# Patient Record
Sex: FEMALE | Race: BLACK OR AFRICAN AMERICAN | NOT HISPANIC OR LATINO | Employment: FULL TIME | URBAN - METROPOLITAN AREA
[De-identification: names, ages, dates, MRNs, and addresses within clinical notes are randomized per-mention and may not be internally consistent; named-entity substitution may affect disease eponyms.]

---

## 2017-02-07 ENCOUNTER — OFFICE VISIT (OUTPATIENT)
Dept: FAMILY MEDICINE | Facility: CLINIC | Age: 37
End: 2017-02-07
Payer: COMMERCIAL

## 2017-02-07 VITALS
RESPIRATION RATE: 16 BRPM | HEART RATE: 70 BPM | BODY MASS INDEX: 45.99 KG/M2 | SYSTOLIC BLOOD PRESSURE: 128 MMHG | OXYGEN SATURATION: 98 % | HEIGHT: 67 IN | TEMPERATURE: 99 F | WEIGHT: 293 LBS | DIASTOLIC BLOOD PRESSURE: 84 MMHG

## 2017-02-07 DIAGNOSIS — H10.9 BACTERIAL CONJUNCTIVITIS OF LEFT EYE: Primary | ICD-10-CM

## 2017-02-07 PROCEDURE — 99999 PR PBB SHADOW E&M-EST. PATIENT-LVL III: CPT | Mod: PBBFAC,,,

## 2017-02-07 PROCEDURE — 99214 OFFICE O/P EST MOD 30 MIN: CPT | Mod: S$GLB,,, | Performed by: NURSE PRACTITIONER

## 2017-02-07 RX ORDER — IBUPROFEN 800 MG/1
800 TABLET ORAL 3 TIMES DAILY
COMMUNITY
End: 2017-03-10

## 2017-02-07 RX ORDER — HYDROCODONE BITARTRATE AND ACETAMINOPHEN 5; 325 MG/1; MG/1
1 TABLET ORAL EVERY 6 HOURS PRN
COMMUNITY
End: 2017-03-10

## 2017-02-07 RX ORDER — CIPROFLOXACIN HYDROCHLORIDE 3 MG/ML
1 SOLUTION/ DROPS OPHTHALMIC 3 TIMES DAILY
Qty: 30 DROP | Refills: 0 | Status: SHIPPED | OUTPATIENT
Start: 2017-02-07 | End: 2017-02-17

## 2017-02-07 NOTE — PATIENT INSTRUCTIONS
Conjunctivitis, Bacterial    You have an infection in the membranes covering the white part of the eye. This part of the eye is called the conjunctiva. The infection is called conjunctivitis. The most common symptoms of conjunctivitis include a thick, pus-like discharge from the eye, swollen eyelids, redness, eyelids sticking together upon awakening, and a gritty or scratchy feeling in the eye. Your infection was caused by bacteria. It may be treated with medicine. With treatment, the infection takes about 7 to 10 days to resolve.  Home care  · Use prescribed antibiotic eye drops or ointment as directed to treat the infection.  · Apply a warm compress (towel soaked in warm water) to the affected eye 3 to 4 times a day. Do this just before applying medicine to the eye.  · Use a warm, wet cloth to wipe away crusting of the eyelids in the morning. This is caused by mucus drainage during the night. You may also use saline irrigating solution or artificial tears to rinse away mucus in the eye. Do not put a patch over the eye.  · Wash your hands before and after touching the infected eye. This is to prevent spreading the infection to the other eye, and to other people. Do not share your towels or washcloths with others.  · You may use acetaminophen or ibuprofen to control pain, unless another medicine was prescribed. (Note: If you have chronic liver or kidney disease or have ever had a stomach ulcer or gastrointestinal bleeding, talk with your doctor before using these medicines.)  · Do not wear contact lenses until your eyes have healed and all symptoms are gone.  Follow-up care  Follow up with your healthcare provider, or as advised.  When to seek medical advice  Call your healthcare provider right away if any of these occur:  · Worsening vision  · Increasing pain in the eye  · Increasing swelling or redness of the eyelid  · Redness spreading around the eye  Date Last Reviewed: 6/14/2015  © 5706-4637 [employer]. [address] [place] [address], [place] [address]. All rights reserved. This information is not intended as a substitute for professional medical care. Always follow your healthcare professional's instructions.

## 2017-02-07 NOTE — MR AVS SNAPSHOT
[facility]  [address]  [place] [address]  Phone: [phone]  Fax: [phone]                  Arleen Powell   2017 11:15 AM   Office Visit    Description:  Female : 1980   Provider:  LAPALCO, W[facility] IN   Department:  [facility]           Reason for Visit     Eye Problem           Diagnoses this Visit        Comments    Bacterial conjunctivitis of left eye    -  Primary            To Do List           Future Appointments        Provider Department Dept Phone    2017 11:15 AM LAPALCO, WALK IN [facility] [phone]      Goals (5 Years of Data)     None      Follow-Up and Disposition     Return if symptoms worsen or fail to improve.       These Medications        Disp Refills Start End    ciprofloxacin HCl (CILOXAN) 0.3 % ophthalmic solution 30 drop 0 2017    Place 1 drop into the left eye 3 (three) times daily. - Left Eye    Pharmacy: [facility] [address] [place], [place] [address] [address] Ph #: [phone]         [facility] On Call     [facility] On Call Nurse Care Line -  Assistance  Registered nurses in the [facility] On Call Center provide clinical advisement, health education, appointment booking, and other advisory services.  Call for this free service at [phone].             Medications           Message regarding Medications     Verify the changes and/or additions to your medication regime listed below are the same as discussed with your clinician today.  If any of these changes or additions are incorrect, please notify your healthcare provider.        START taking these NEW medications        Refills    ciprofloxacin HCl (CILOXAN) 0.3 % ophthalmic solution 0    Sig: Place 1 drop into the left eye 3 (three) times daily.    Class: Normal    Route: Left Eye           Verify that the below list of medications is an accurate representation of the medications you are currently taking.  If  "none reported, the list may be blank. If incorrect, please contact your healthcare provider. Carry this list with you in case of emergency.           Current Medications     hydrocodone-acetaminophen 5-325mg (NORCO) 5-325 mg per tablet Take 1 tablet by mouth every 6 (six) hours as needed for Pain.    ibuprofen (ADVIL,MOTRIN) 800 MG tablet Take 800 mg by mouth 3 (three) times daily.    ciprofloxacin HCl (CILOXAN) 0.3 % ophthalmic solution Place 1 drop into the left eye 3 (three) times daily.    valacyclovir (VALTREX) 1000 MG tablet Take 0.5 tablets (500 mg total) by mouth 2 (two) times daily.           Clinical Reference Information           Your Vitals Were     BP Pulse Temp Resp Height Weight    128/84 (BP Location: Left arm, Patient Position: Sitting, BP Method: Manual) 70 98.7 °F (37.1 °C) (Oral) 16 5' 7" (1.702 m) 143 kg (315 lb 4.1 oz)    Last Period SpO2 BMI          02/01/2017 98% 49.38 kg/m2        Blood Pressure          Most Recent Value    BP  128/84      Allergies as of 2/7/2017     No Known Allergies      Immunizations Administered on Date of Encounter - 2/7/2017     None      [facility] Sign-Up     Activating your [facility] account is as easy as 1-2-3!     1) Visit [email or web address], select Sign Up Now, enter this activation code and your date of birth, then select Next.  [email or web address]  Expires: 3/24/2017 11:04 AM      2) Create a username and password to use when you visit [facility] in the future and select a security question in case you lose your password and select Next.    3) Enter your e-mail address and click Sign Up!    Additional Information  If you have questions, please e-mail [email or web address] or call [phone] to talk to our [facility] staff. Remember, [facility] is NOT to be used for urgent needs. For medical emergencies, dial 911.         Instructions      Conjunctivitis, Bacterial    You have an infection in the membranes covering the white part of the eye. This part of the eye is " called the conjunctiva. The infection is called conjunctivitis. The most common symptoms of conjunctivitis include a thick, pus-like discharge from the eye, swollen eyelids, redness, eyelids sticking together upon awakening, and a gritty or scratchy feeling in the eye. Your infection was caused by bacteria. It may be treated with medicine. With treatment, the infection takes about 7 to 10 days to resolve.  Home care  · Use prescribed antibiotic eye drops or ointment as directed to treat the infection.  · Apply a warm compress (towel soaked in warm water) to the affected eye 3 to 4 times a day. Do this just before applying medicine to the eye.  · Use a warm, wet cloth to wipe away crusting of the eyelids in the morning. This is caused by mucus drainage during the night. You may also use saline irrigating solution or artificial tears to rinse away mucus in the eye. Do not put a patch over the eye.  · Wash your hands before and after touching the infected eye. This is to prevent spreading the infection to the other eye, and to other people. Do not share your towels or washcloths with others.  · You may use acetaminophen or ibuprofen to control pain, unless another medicine was prescribed. (Note: If you have chronic liver or kidney disease or have ever had a stomach ulcer or gastrointestinal bleeding, talk with your doctor before using these medicines.)  · Do not wear contact lenses until your eyes have healed and all symptoms are gone.  Follow-up care  Follow up with your healthcare provider, or as advised.  When to seek medical advice  Call your healthcare provider right away if any of these occur:  · Worsening vision  · Increasing pain in the eye  · Increasing swelling or redness of the eyelid  · Redness spreading around the eye  Date Last Reviewed: 6/14/2015  © 8741-1252 [employer]. [address] [place] [address], [place] [address]. All rights reserved. This information is not intended as a substitute for  professional medical care. Always follow your healthcare professional's instructions.             Language Assistance Services     ATTENTION: Language assistance services are available, free of charge. Please call [phone].      ATENCIÓN: Si habla avinash, tiene a malik disposición servicios gratuitos de asistencia lingüística. Llame al [phone].     CHÚ Ý: N?u b?n nói Ti?ng Vi?t, có các d?ch v? h? tr? ngôn ng? mi?n phí dành cho b?n. G?i s? [phone].         [facility] complies with applicable Federal civil rights laws and does not discriminate on the basis of race, color, national origin, age, disability, or sex.

## 2017-02-07 NOTE — PROGRESS NOTES
"History of Present Illness   Arleen Powell is a 37 y.o. woman with medical history as listed below who presents today with five day history of left eye redness, itching, and burning pain. She first noticed symptoms after removing contact lenses that she had not worn in quite some time. This morning she woke with eye crusted shut and has had continued drainage throughout the day. She denies associated fevers or chills. She has no additional complaints and is otherwise healthy on today's visit.       Past Medical History   Diagnosis Date    Abnormal Pap smear     Abnormal Pap smear of vagina     Hypertension     Pre-eclampsia        Past Surgical History   Procedure Laterality Date    Pelvic laparoscopy Right 2013     SALPINJECTOMY(ECTOPIC)       Social History     Social History    Marital status: Single     Spouse name: N/A    Number of children: N/A    Years of education: N/A     Social History Main Topics    Smoking status: Never Smoker    Smokeless tobacco: Never Used    Alcohol use No    Drug use: No    Sexual activity: Yes     Partners: Male     Birth control/ protection: None     Other Topics Concern    None     Social History Narrative       History reviewed. No pertinent family history.    Review of Systems  Review of Systems   Constitutional: Negative for chills and fever.   Eyes: Positive for photophobia, pain (burning), discharge and redness. Negative for blurred vision.     A complete review of systems was otherwise negative.    Physical Exam  Visit Vitals    /84 (BP Location: Left arm, Patient Position: Sitting, BP Method: Manual)    Pulse 70    Temp 98.7 °F (37.1 °C) (Oral)    Resp 16    Ht 5' 7" (1.702 m)    Wt (!) 143 kg (315 lb 4.1 oz)    LMP 02/01/2017    SpO2 98%    BMI 49.38 kg/m2     General appearance: alert, appears stated age, cooperative and no distress  Eyes: positive findings: conjunctiva: left bacterial conjunctivitis  Neurologic: Grossly " normal    Assessment/Plan  Bacterial conjunctivitis of left eye  Treatment as listed below.  Discussed importance of good hand hygiene.  Avoid contacts until clear.  May use warm compress to remove morning crusting.  May try Zyrtec or Benadryl for itching.  Contact me or RTC if symptoms worsen or fail to improve as expected.  -     ciprofloxacin HCl (CILOXAN) 0.3 % ophthalmic solution; Place 1 drop into the left eye 3 (three) times daily.  Dispense: 30 drop; Refill: 0      Return if symptoms worsen or fail to improve.

## 2017-03-10 ENCOUNTER — OFFICE VISIT (OUTPATIENT)
Dept: FAMILY MEDICINE | Facility: CLINIC | Age: 37
End: 2017-03-10
Payer: COMMERCIAL

## 2017-03-10 VITALS
OXYGEN SATURATION: 97 % | TEMPERATURE: 99 F | BODY MASS INDEX: 44.41 KG/M2 | HEIGHT: 68 IN | WEIGHT: 293 LBS | HEART RATE: 94 BPM

## 2017-03-10 DIAGNOSIS — R05.9 COUGH: ICD-10-CM

## 2017-03-10 DIAGNOSIS — J11.1 INFLUENZA-LIKE ILLNESS: ICD-10-CM

## 2017-03-10 DIAGNOSIS — J01.90 ACUTE RHINOSINUSITIS: Primary | ICD-10-CM

## 2017-03-10 DIAGNOSIS — R52 BODY ACHES: ICD-10-CM

## 2017-03-10 LAB
CTP QC/QA: YES
FLUAV AG NPH QL: NEGATIVE
FLUBV AG NPH QL: NEGATIVE

## 2017-03-10 PROCEDURE — 99999 PR PBB SHADOW E&M-EST. PATIENT-LVL III: CPT | Mod: PBBFAC,,, | Performed by: NURSE PRACTITIONER

## 2017-03-10 PROCEDURE — 87804 INFLUENZA ASSAY W/OPTIC: CPT | Mod: QW,S$GLB,, | Performed by: NURSE PRACTITIONER

## 2017-03-10 PROCEDURE — 99214 OFFICE O/P EST MOD 30 MIN: CPT | Mod: S$GLB,,, | Performed by: NURSE PRACTITIONER

## 2017-03-10 RX ORDER — LEVOCETIRIZINE DIHYDROCHLORIDE 5 MG/1
5 TABLET, FILM COATED ORAL NIGHTLY
Qty: 30 TABLET | Refills: 0 | Status: SHIPPED | OUTPATIENT
Start: 2017-03-10 | End: 2017-08-23

## 2017-03-10 RX ORDER — FLUTICASONE PROPIONATE 50 MCG
1 SPRAY, SUSPENSION (ML) NASAL DAILY
Qty: 1 BOTTLE | Refills: 0 | Status: SHIPPED | OUTPATIENT
Start: 2017-03-10 | End: 2017-08-23

## 2017-03-10 RX ORDER — PROMETHAZINE HYDROCHLORIDE AND DEXTROMETHORPHAN HYDROBROMIDE 6.25; 15 MG/5ML; MG/5ML
5 SYRUP ORAL NIGHTLY
Qty: 180 ML | Refills: 0 | Status: SHIPPED | OUTPATIENT
Start: 2017-03-10 | End: 2017-03-20

## 2017-03-10 NOTE — PATIENT INSTRUCTIONS

## 2017-03-10 NOTE — PROGRESS NOTES
"History of Present Illness   Arleen Powell is a 37 y.o. woman with medical history as listed below who presents today with two day history of nasal congestion, PND, and productive cough. She also has some sinus pressure and headaches. She has had chills and body aches but denies known fevers. She denies ear pain or fullness, itchy watery eyes, or other complaints. She did receive a flu shot this year. She has had sick contacts. She has not tried OTC medications. She has no additional complaints and is otherwise healthy on today's visit.       Past Medical History:   Diagnosis Date    Abnormal Pap smear     Abnormal Pap smear of vagina     Hypertension     Pre-eclampsia        Past Surgical History:   Procedure Laterality Date    PELVIC LAPAROSCOPY Right 2013    SALPINJECTOMY(ECTOPIC)       Social History     Social History    Marital status: Single     Spouse name: N/A    Number of children: N/A    Years of education: N/A     Social History Main Topics    Smoking status: Never Smoker    Smokeless tobacco: Never Used    Alcohol use No    Drug use: No    Sexual activity: Yes     Partners: Male     Birth control/ protection: None     Other Topics Concern    None     Social History Narrative       History reviewed. No pertinent family history.    Review of Systems  Review of Systems   Constitutional: Positive for chills and malaise/fatigue. Negative for fever.   HENT: Positive for congestion and sore throat. Negative for ear discharge and ear pain.    Eyes: Negative for discharge and redness.   Respiratory: Positive for cough. Negative for sputum production, shortness of breath and wheezing.    Cardiovascular: Negative for chest pain.   Gastrointestinal: Negative for nausea and vomiting.   Neurological: Positive for headaches.     A complete review of systems was otherwise negative.    Physical Exam  Pulse 94  Temp 98.7 °F (37.1 °C) (Oral)   Ht 5' 8" (1.727 m)  Wt (!) 144.9 kg (319 lb 7.1 oz) "  LMP 03/02/2017  SpO2 97%  BMI 48.57 kg/m2  General appearance: alert, appears stated age, cooperative, no distress and ill appearing  Eyes: negative findings: lids and lashes normal and conjunctivae and sclerae normal  Ears: normal TM's and external ear canals both ears and bilateral middle ear effusion  Nose: clear discharge, moderate congestion, turbinates red, swollen, sinus tenderness bilateral  Throat: lips, mucosa, and tongue normal; teeth and gums normal and moderate oropharyngeal erythema with PND  Lungs: clear to auscultation bilaterally  Heart: regular rate and rhythm, S1, S2 normal, no murmur, click, rub or gallop  Lymph nodes: Cervical, supraclavicular, and axillary nodes normal.  Neurologic: Grossly normal    Assessment/Plan  Acute rhinosinusitis  Rapid flu negative. Discussed with patient this is viral, no antibiotics necessary. Xyzal and Flonase with nighttime cough syrup for symptomatic treatment. Ibuprofen or Tylenol as needed for fever and body aches. Plenty of rest and plenty of fluids. She will contact me if symptoms worsen or fail to improve. She has verbalized understanding and is in agreement with plan of care.  -     levocetirizine (XYZAL) 5 MG tablet; Take 1 tablet (5 mg total) by mouth every evening.  Dispense: 30 tablet; Refill: 0  -     fluticasone (FLONASE) 50 mcg/actuation nasal spray; 1 spray by Each Nare route once daily.  Dispense: 1 Bottle; Refill: 0  -     promethazine-dextromethorphan (PROMETHAZINE-DM) 6.25-15 mg/5 mL Syrp; Take 5 mLs by mouth every evening.  Dispense: 180 mL; Refill: 0    Influenza-like illness  As above.  -     levocetirizine (XYZAL) 5 MG tablet; Take 1 tablet (5 mg total) by mouth every evening.  Dispense: 30 tablet; Refill: 0  -     fluticasone (FLONASE) 50 mcg/actuation nasal spray; 1 spray by Each Nare route once daily.  Dispense: 1 Bottle; Refill: 0  -     promethazine-dextromethorphan (PROMETHAZINE-DM) 6.25-15 mg/5 mL Syrp; Take 5 mLs by mouth every  evening.  Dispense: 180 mL; Refill: 0    Cough  As above.  -     POCT Influenza A/B  -     levocetirizine (XYZAL) 5 MG tablet; Take 1 tablet (5 mg total) by mouth every evening.  Dispense: 30 tablet; Refill: 0  -     fluticasone (FLONASE) 50 mcg/actuation nasal spray; 1 spray by Each Nare route once daily.  Dispense: 1 Bottle; Refill: 0  -     promethazine-dextromethorphan (PROMETHAZINE-DM) 6.25-15 mg/5 mL Syrp; Take 5 mLs by mouth every evening.  Dispense: 180 mL; Refill: 0    Body aches  As above.  -     POCT Influenza A/B  -     levocetirizine (XYZAL) 5 MG tablet; Take 1 tablet (5 mg total) by mouth every evening.  Dispense: 30 tablet; Refill: 0  -     fluticasone (FLONASE) 50 mcg/actuation nasal spray; 1 spray by Each Nare route once daily.  Dispense: 1 Bottle; Refill: 0  -     promethazine-dextromethorphan (PROMETHAZINE-DM) 6.25-15 mg/5 mL Syrp; Take 5 mLs by mouth every evening.  Dispense: 180 mL; Refill: 0      Return if symptoms worsen or fail to improve.

## 2017-03-10 NOTE — MR AVS SNAPSHOT
[facility]  [address]  [place] [address]  Phone: [phone]  Fax: [phone]                  Arleen Powell   3/10/2017 10:45 AM   Office Visit    Description:  Female : 1980   Provider:  Samantha Taylor NP   Department:  [facility]           Reason for Visit     URI           Diagnoses this Visit        Comments    Acute rhinosinusitis    -  Primary     Influenza-like illness         Cough         Body aches                To Do List           Goals (5 Years of Data)     None      Follow-Up and Disposition     Return if symptoms worsen or fail to improve.       These Medications        Disp Refills Start End    levocetirizine (XYZAL) 5 MG tablet 30 tablet 0 3/10/2017 3/10/2018    Take 1 tablet (5 mg total) by mouth every evening. - Oral    Pharmacy: [address] Ph #: [phone]       fluticasone (FLONASE) 50 mcg/actuation nasal spray 1 Bottle 0 3/10/2017     1 spray by Each Nare route once daily. - Each Nare    Pharmacy: [address] Ph #: [phone]       promethazine-dextromethorphan (PROMETHAZINE-DM) 6.25-15 mg/5 mL Syrp 180 mL 0 3/10/2017 3/20/2017    Take 5 mLs by mouth every evening. - Oral    Pharmacy: [address] Ph #: [phone]         [facility] On [facility]     [facility] -  Assistance  Registered nurses in the [facility] provide clinical advisement, health education, appointment booking, and other advisory services.  Call for this free service at [phone].             Medications           Message regarding Medications     Verify the changes and/or additions to your medication regime listed below are the same as discussed with your clinician today.  If any of these changes or additions are incorrect, please notify  "your healthcare provider.        START taking these NEW medications        Refills    levocetirizine (XYZAL) 5 MG tablet 0    Sig: Take 1 tablet (5 mg total) by mouth every evening.    Class: Normal    Route: Oral    fluticasone (FLONASE) 50 mcg/actuation nasal spray 0    Si spray by Each Nare route once daily.    Class: Normal    Route: Each Nare    promethazine-dextromethorphan (PROMETHAZINE-DM) 6.25-15 mg/5 mL Syrp 0    Sig: Take 5 mLs by mouth every evening.    Class: Normal    Route: Oral      STOP taking these medications     hydrocodone-acetaminophen 5-325mg (NORCO) 5-325 mg per tablet Take 1 tablet by mouth every 6 (six) hours as needed for Pain.    ibuprofen (ADVIL,MOTRIN) 800 MG tablet Take 800 mg by mouth 3 (three) times daily.    valacyclovir (VALTREX) 1000 MG tablet Take 0.5 tablets (500 mg total) by mouth 2 (two) times daily.           Verify that the below list of medications is an accurate representation of the medications you are currently taking.  If none reported, the list may be blank. If incorrect, please contact your healthcare provider. Carry this list with you in case of emergency.           Current Medications     fluticasone (FLONASE) 50 mcg/actuation nasal spray 1 spray by Each Nare route once daily.    levocetirizine (XYZAL) 5 MG tablet Take 1 tablet (5 mg total) by mouth every evening.    promethazine-dextromethorphan (PROMETHAZINE-DM) 6.25-15 mg/5 mL Syrp Take 5 mLs by mouth every evening.           Clinical Reference Information           Your Vitals Were     Pulse Temp Height Weight Last Period SpO2    94 98.7 °F (37.1 °C) (Oral) 5' 8" (1.727 m) 144.9 kg (319 lb 7.1 oz) 2017 97%    BMI                48.57 kg/m2          Allergies as of 3/10/2017     No Known Allergies      Immunizations Administered on Date of Encounter - 3/10/2017     None      Orders Placed During Today's Visit      Normal Orders This Visit    POCT Influenza A/B       [facility] Sign-Up     Activating your " [facility] account is as easy as 1-2-3!     1) Visit [email or web address], select Sign Up Now, enter this activation code and your date of birth, then select Next.  [email or web address]  Expires: 3/24/2017 11:04 AM      2) Create a username and password to use when you visit [facility] in the future and select a security question in case you lose your password and select Next.    3) Enter your e-mail address and click Sign Up!    Additional Information  If you have questions, please e-mail [email or web address] or call [phone] to talk to our [facility] staff. Remember, [facility] is NOT to be used for urgent needs. For medical emergencies, dial 911.         Instructions      Sinusitis (No Antibiotics)    The sinuses are air-filled spaces within the bones of the face. They connect to the inside of the nose. Sinusitis is an inflammation of the tissue lining the sinus cavity. Sinus inflammation can occur during a cold. It can also be due to allergies to pollens and other particles in the air. It can cause symptoms such as sinus congestion, headache, sore throat, facial swelling and fullness. It may also cause a low-grade fever. No infection is present, and no antibiotic treatment is needed.  Home care  · Drink plenty of water, hot tea, and other liquids. This may help thin mucus. It also may promote sinus drainage.  · Heat may help soothe painful areas of the face. Use a towel soaked in hot water. Or,  the shower and direct the hot spray onto your face. Using a vaporizer along with a menthol rub at night may also help.   · An expectorant containing guaifenesin may help thin the mucus and promote drainage from the sinuses.  · Over-the-counter decongestants may be used unless a similar medicine was prescribed. Nasal sprays work the fastest. Use one that contains phenylephrine or oxymetazoline. First blow the nose gently. Then use the spray. Do not use these medicines more often than directed on the label or symptoms may  get worse. You may also use tablets containing pseudoephedrine. Avoid products that combine ingredients, because side effects may be increased. Read labels. You can also ask the pharmacist for help. (NOTE: Persons with high blood pressure should not use decongestants. They can raise blood pressure.)  · Over-the-counter antihistamines may help if allergies contributed to your sinusitis.    · Use acetaminophen or ibuprofen to control pain, unless another pain medicine was prescribed. (If you have chronic liver or kidney disease or ever had a stomach ulcer, talk with your doctor before using these medicines. Aspirin should never be used in anyone under 18 years of age who is ill with a fever. It may cause severe liver damage.)  · Use nasal rinses or irrigation as instructed by your health care provider.  · Don't smoke. This can worsen symptoms.  Follow-up care  Follow up with your healthcare provider or our staff if you are not improving within the next week.  When to seek medical advice  Call your healthcare provider if any of these occur:  · Green or yellow discharge from the nose or into the throat  · Facial pain or headache becoming more severe  · Stiff neck  · Unusual drowsiness or confusion  · Swelling of the forehead or eyelids  · Vision problems, including blurred or double vision  · Fever of 100.4ºF (38ºC) or higher, or as directed by your healthcare provider  · Seizure  · Breathing problems  · Symptoms not resolving within 10 days  Date Last Reviewed: 4/13/2015  © 6528-1266 [employer]. [address] [place] [address]. All rights reserved. This information is not intended as a substitute for professional medical care. Always follow your healthcare professional's instructions.             Language Assistance Services     ATTENTION: Language assistance services are available, free of charge. Please call [phone].      ATENCIÓN: Si julian da silva, bita a malik disposición servicios  loreos de asistencia lingüística. [place] mota [phone].     JOSEPH Ý: N?u b?n nói Ti?ng Vi?t, có các d?ch v? h? tr? ngôn ng? mi?n phí dành cho b?n. G?i s? [phone].         [facility] complies with applicable Federal civil rights laws and does not discriminate on the basis of race, color, national origin, age, disability, or sex.

## 2017-08-23 ENCOUNTER — OFFICE VISIT (OUTPATIENT)
Dept: FAMILY MEDICINE | Facility: CLINIC | Age: 37
End: 2017-08-23
Payer: COMMERCIAL

## 2017-08-23 VITALS
RESPIRATION RATE: 17 BRPM | DIASTOLIC BLOOD PRESSURE: 80 MMHG | WEIGHT: 293 LBS | SYSTOLIC BLOOD PRESSURE: 126 MMHG | TEMPERATURE: 98 F | BODY MASS INDEX: 44.41 KG/M2 | OXYGEN SATURATION: 98 % | HEART RATE: 72 BPM | HEIGHT: 68 IN

## 2017-08-23 DIAGNOSIS — G89.29 CHRONIC PAIN OF RIGHT KNEE: Primary | ICD-10-CM

## 2017-08-23 DIAGNOSIS — M25.561 CHRONIC PAIN OF RIGHT KNEE: Primary | ICD-10-CM

## 2017-08-23 DIAGNOSIS — E66.01 MORBID OBESITY, UNSPECIFIED OBESITY TYPE: ICD-10-CM

## 2017-08-23 PROCEDURE — 3079F DIAST BP 80-89 MM HG: CPT | Mod: S$GLB,,, | Performed by: INTERNAL MEDICINE

## 2017-08-23 PROCEDURE — 3074F SYST BP LT 130 MM HG: CPT | Mod: S$GLB,,, | Performed by: INTERNAL MEDICINE

## 2017-08-23 PROCEDURE — 3008F BODY MASS INDEX DOCD: CPT | Mod: S$GLB,,, | Performed by: INTERNAL MEDICINE

## 2017-08-23 PROCEDURE — 99214 OFFICE O/P EST MOD 30 MIN: CPT | Mod: S$GLB,,, | Performed by: INTERNAL MEDICINE

## 2017-08-23 PROCEDURE — 99999 PR PBB SHADOW E&M-EST. PATIENT-LVL III: CPT | Mod: PBBFAC,,, | Performed by: INTERNAL MEDICINE

## 2017-08-23 RX ORDER — NAPROXEN SODIUM 220 MG
220 TABLET ORAL
COMMUNITY
End: 2018-01-17

## 2017-08-23 NOTE — PROGRESS NOTES
"Subjective:       Patient ID: Arleen Powell is a 37 y.o. female.    Chief Complaint: Knee Pain (right)    Knee pain    HPI: 38 y/o w/ morbid obesity comes to discuss acute on chronic right lateral knee pain. Pain has been present intermittently for several years worse over last month. Not taking any medications for pain pain lateral aspect of knee with radiation to lower leg (not to foot) no paratheisa or motor weakness. She is working on losing weight by preparing meals, has changed work schedule and tryign to increase physical activity (walking). No history of knee surgery or injection no laxity. No trauma. NSAID's relieves pain (takes two to three times per week)      Review of Systems   Constitutional: Negative for activity change, appetite change, fatigue, fever and unexpected weight change.   HENT: Negative for ear pain, rhinorrhea and sore throat.    Eyes: Negative for discharge and visual disturbance.   Respiratory: Negative for chest tightness, shortness of breath and wheezing.    Cardiovascular: Negative for chest pain, palpitations and leg swelling.   Gastrointestinal: Negative for abdominal pain, constipation and diarrhea.   Endocrine: Negative for cold intolerance and heat intolerance.   Genitourinary: Negative for dysuria and hematuria.   Musculoskeletal: Positive for arthralgias. Negative for joint swelling and neck stiffness.   Skin: Negative for rash.   Neurological: Negative for dizziness, syncope, weakness and headaches.   Psychiatric/Behavioral: Negative for suicidal ideas.       Objective:     Vitals:    08/23/17 1121   BP: 126/80   BP Location: Left arm   Patient Position: Sitting   BP Method: Large (Manual)   Pulse: 72   Resp: 17   Temp: 98 °F (36.7 °C)   TempSrc: Oral   SpO2: 98%   Weight: (!) 145.6 kg (321 lb)   Height: 5' 8" (1.727 m)          Physical Exam   Constitutional: She is oriented to person, place, and time. She appears well-developed and well-nourished.   obese   HENT: "   Head: Normocephalic and atraumatic.   Eyes: Conjunctivae are normal. Pupils are equal, round, and reactive to light.   Neck: Normal range of motion.   Cardiovascular: Normal rate and regular rhythm.  Exam reveals no gallop and no friction rub.    No murmur heard.  Pulmonary/Chest: Effort normal and breath sounds normal. She has no wheezes. She has no rales.   Abdominal: Soft. Bowel sounds are normal. There is no tenderness. There is no rebound and no guarding.   Musculoskeletal: Normal range of motion. She exhibits no edema or tenderness.   No appreciable joint effusion by exam limited by body habitus. No appreciabel laxity with anterior/posterior drawer or varus valgus strain   Neurological: She is alert and oriented to person, place, and time. No cranial nerve deficit.   5/5 knee flexion and extension bilaterally normal gait observed   Skin: Skin is warm and dry.   Psychiatric: She has a normal mood and affect.       Assessment:       1. Chronic pain of right knee    2. Morbid obesity, unspecified obesity type        Plan:    no evidence of ligamentous injury consider ITB syndrome versus PFS, would benefit from weight loss not limiting in function currently okay for PRN NSAID not to exceed five consecutive days    2. Check a1c, tsh and lfts for secondary causes or complications encourage calorie count if no improvement in three months we discussed meeting with bariatric medicine.

## 2017-12-06 ENCOUNTER — OFFICE VISIT (OUTPATIENT)
Dept: FAMILY MEDICINE | Facility: CLINIC | Age: 37
End: 2017-12-06
Payer: COMMERCIAL

## 2017-12-06 ENCOUNTER — LAB VISIT (OUTPATIENT)
Dept: LAB | Facility: HOSPITAL | Age: 37
End: 2017-12-06
Attending: INTERNAL MEDICINE
Payer: COMMERCIAL

## 2017-12-06 VITALS
HEIGHT: 68 IN | BODY MASS INDEX: 44.41 KG/M2 | OXYGEN SATURATION: 99 % | WEIGHT: 293 LBS | RESPIRATION RATE: 18 BRPM | DIASTOLIC BLOOD PRESSURE: 82 MMHG | TEMPERATURE: 98 F | SYSTOLIC BLOOD PRESSURE: 126 MMHG | HEART RATE: 78 BPM

## 2017-12-06 DIAGNOSIS — G89.29 CHRONIC PAIN OF RIGHT KNEE: ICD-10-CM

## 2017-12-06 DIAGNOSIS — M25.561 CHRONIC PAIN OF RIGHT KNEE: ICD-10-CM

## 2017-12-06 DIAGNOSIS — M76.31 ILIOTIBIAL BAND SYNDROME OF RIGHT SIDE: Primary | ICD-10-CM

## 2017-12-06 DIAGNOSIS — E66.01 MORBID OBESITY, UNSPECIFIED OBESITY TYPE: ICD-10-CM

## 2017-12-06 DIAGNOSIS — E66.01 MORBID OBESITY: ICD-10-CM

## 2017-12-06 LAB
ALBUMIN SERPL BCP-MCNC: 3.4 G/DL
ALP SERPL-CCNC: 82 U/L
ALT SERPL W/O P-5'-P-CCNC: 18 U/L
ANION GAP SERPL CALC-SCNC: 5 MMOL/L
AST SERPL-CCNC: 18 U/L
BASOPHILS # BLD AUTO: 0.02 K/UL
BASOPHILS NFR BLD: 0.3 %
BILIRUB SERPL-MCNC: 0.4 MG/DL
BUN SERPL-MCNC: 9 MG/DL
CALCIUM SERPL-MCNC: 9.2 MG/DL
CHLORIDE SERPL-SCNC: 105 MMOL/L
CO2 SERPL-SCNC: 29 MMOL/L
CREAT SERPL-MCNC: 0.8 MG/DL
DIFFERENTIAL METHOD: ABNORMAL
EOSINOPHIL # BLD AUTO: 0.3 K/UL
EOSINOPHIL NFR BLD: 3.8 %
ERYTHROCYTE [DISTWIDTH] IN BLOOD BY AUTOMATED COUNT: 16.7 %
EST. GFR  (AFRICAN AMERICAN): >60 ML/MIN/1.73 M^2
EST. GFR  (NON AFRICAN AMERICAN): >60 ML/MIN/1.73 M^2
GLUCOSE SERPL-MCNC: 86 MG/DL
HCT VFR BLD AUTO: 36.4 %
HGB BLD-MCNC: 11.1 G/DL
LYMPHOCYTES # BLD AUTO: 2 K/UL
LYMPHOCYTES NFR BLD: 28.7 %
MCH RBC QN AUTO: 23.4 PG
MCHC RBC AUTO-ENTMCNC: 30.5 G/DL
MCV RBC AUTO: 77 FL
MONOCYTES # BLD AUTO: 0.9 K/UL
MONOCYTES NFR BLD: 13.2 %
NEUTROPHILS # BLD AUTO: 3.8 K/UL
NEUTROPHILS NFR BLD: 53.9 %
PLATELET # BLD AUTO: 271 K/UL
PMV BLD AUTO: 11.6 FL
POTASSIUM SERPL-SCNC: 4.4 MMOL/L
PROT SERPL-MCNC: 7.5 G/DL
RBC # BLD AUTO: 4.74 M/UL
SODIUM SERPL-SCNC: 139 MMOL/L
TSH SERPL DL<=0.005 MIU/L-ACNC: 2.21 UIU/ML
WBC # BLD AUTO: 7.05 K/UL

## 2017-12-06 PROCEDURE — 84443 ASSAY THYROID STIM HORMONE: CPT

## 2017-12-06 PROCEDURE — 85025 COMPLETE CBC W/AUTO DIFF WBC: CPT

## 2017-12-06 PROCEDURE — 80053 COMPREHEN METABOLIC PANEL: CPT

## 2017-12-06 PROCEDURE — 99214 OFFICE O/P EST MOD 30 MIN: CPT | Mod: S$GLB,,, | Performed by: INTERNAL MEDICINE

## 2017-12-06 PROCEDURE — 83036 HEMOGLOBIN GLYCOSYLATED A1C: CPT

## 2017-12-06 PROCEDURE — 99999 PR PBB SHADOW E&M-EST. PATIENT-LVL III: CPT | Mod: PBBFAC,,, | Performed by: INTERNAL MEDICINE

## 2017-12-06 PROCEDURE — 36415 COLL VENOUS BLD VENIPUNCTURE: CPT | Mod: PN

## 2017-12-06 NOTE — PROGRESS NOTES
"Subjective:       Patient ID: Arleen Powell is a 37 y.o. female.    Chief Complaint: Chronic Pain and Follow-up    HPI  Review of Systems    Objective:     Vitals:    12/06/17 0946   BP: 126/82   BP Location: Left arm   Patient Position: Sitting   BP Method: Large (Manual)   Pulse: 78   Resp: 18   Temp: 98.3 °F (36.8 °C)   TempSrc: Oral   SpO2: 99%   Weight: (!) 145.1 kg (319 lb 14.2 oz)   Height: 5' 8" (1.727 m)          Physical Exam    Assessment:       No diagnosis found.    Plan:       ***    "

## 2017-12-06 NOTE — PROGRESS NOTES
"Subjective:       Patient ID: Arleen Powell is a 37 y.o. female.    Chief Complaint: Chronic Pain and Follow-up    Knee pain    HPI: 36 y/o w/ morbid obesity and chronic right lateral knee pain. Pain present >1 year discussed PT referal at last visit in Aug 2017 along with screening labs for obesity. She has not gotten these. She does report pain relief with OTC naproxen taking approximately every three days. No paratheisa pain does radiate to lateral aspect of lower leg no parathesia or motor weakness. She is requesting an MRI.       Review of Systems   Constitutional: Negative for activity change, appetite change, fatigue, fever and unexpected weight change.   HENT: Negative for ear pain, rhinorrhea and sore throat.    Eyes: Negative for discharge and visual disturbance.   Respiratory: Negative for chest tightness, shortness of breath and wheezing.    Cardiovascular: Negative for chest pain, palpitations and leg swelling.   Gastrointestinal: Negative for abdominal pain, constipation and diarrhea.   Endocrine: Negative for cold intolerance and heat intolerance.   Genitourinary: Negative for dysuria and hematuria.   Musculoskeletal: Positive for arthralgias. Negative for joint swelling and neck stiffness.   Skin: Negative for rash.   Neurological: Negative for dizziness, syncope, weakness and headaches.   Psychiatric/Behavioral: Negative for suicidal ideas.       Objective:     Vitals:    12/06/17 0946   BP: 126/82   BP Location: Left arm   Patient Position: Sitting   BP Method: Large (Manual)   Pulse: 78   Resp: 18   Temp: 98.3 °F (36.8 °C)   TempSrc: Oral   SpO2: 99%   Weight: (!) 145.1 kg (319 lb 14.2 oz)   Height: 5' 8" (1.727 m)          Physical Exam   Constitutional: She is oriented to person, place, and time. She appears well-developed and well-nourished. No distress.   HENT:   Head: Normocephalic and atraumatic.   Eyes: Conjunctivae are normal. Pupils are equal, round, and reactive to light. No " scleral icterus.   Neck: Normal range of motion. Neck supple.   Musculoskeletal:   Limited exam due to body habitus no joint line tenderness or significant palpable effusion no laxity to varus/valgus strain negative anterior and posterior drawer   Neurological: She is alert and oriented to person, place, and time.   5/5 dorsi and plantar flexion of bilateral feet   Skin: She is not diaphoretic.       Assessment:       1. Iliotibial band syndrome of right side    2. Chronic pain of right knee    3. Morbid obesity        Plan:        1/2. Given location of pain body habitus and lack of evidence of exam of ligamentaous injury (limited given girth and size of leg) I supsect her pain is primarily for IT band. Would benefit from PT but she would like to discuss with orthopedics for possible advanced imaging referal placed continue with periodic NSAID for anti inflammatory effect    3. Discussed weight contributing to her knee pain, will get labs to screen for DM hypothyroid in light of NSAID use check renal function and cbc

## 2017-12-07 LAB
ESTIMATED AVG GLUCOSE: 105 MG/DL
HBA1C MFR BLD HPLC: 5.3 %

## 2018-01-17 ENCOUNTER — OFFICE VISIT (OUTPATIENT)
Dept: OPTOMETRY | Facility: CLINIC | Age: 38
End: 2018-01-17
Payer: COMMERCIAL

## 2018-01-17 DIAGNOSIS — H52.223 REGULAR ASTIGMATISM OF BOTH EYES: Primary | ICD-10-CM

## 2018-01-17 DIAGNOSIS — Z46.0 ENCOUNTER FOR FITTING OR ADJUSTMENT OF SPECTACLES OR CONTACT LENSES: Primary | ICD-10-CM

## 2018-01-17 PROCEDURE — 92310 CONTACT LENS FITTING OU: CPT | Mod: ,,, | Performed by: OPTOMETRIST

## 2018-01-17 PROCEDURE — 99499 UNLISTED E&M SERVICE: CPT | Mod: S$GLB,,, | Performed by: OPTOMETRIST

## 2018-01-17 PROCEDURE — 92015 DETERMINE REFRACTIVE STATE: CPT | Mod: S$GLB,,, | Performed by: OPTOMETRIST

## 2018-01-17 PROCEDURE — 92014 COMPRE OPH EXAM EST PT 1/>: CPT | Mod: S$GLB,,, | Performed by: OPTOMETRIST

## 2018-01-17 PROCEDURE — 99999 PR PBB SHADOW E&M-EST. PATIENT-LVL II: CPT | Mod: PBBFAC,,, | Performed by: OPTOMETRIST

## 2018-01-17 NOTE — PROGRESS NOTES
Subjective:       Patient ID: Arleen Powell is a 38 y.o. female      Chief Complaint   Patient presents with    Concerns About Ocular Health    Contact Lens Follow Up     History of Present Illness  Dls: 5/6/16 Dr. Hubbard        Pt states no changes in vision. Pt wears pal's and scls. Pt states has not worn cls in 6 month and broke glasses 6 months ago.  Pt states no tearing no itching no burning no  Pain no ha' so floaters.     Eye meds:  None    [employer]  Toric; did not sleep in lenses, wears them on weekends only        Assessment/Plan:     1. Regular astigmatism of both eyes  Educated patient on refractive error and discussed lens options. Dispensed updated spectacle Rx. Educated about adaptation period to new specs.    Eyeglass Final Rx     Eyeglass Final Rx       Sphere Cylinder Axis    Right -1.50 +2.00 070    Left -1.75 +1.75 090    Type:  SVL    Expiration Date:  1/18/2019                Contact lens trials dispensed to pt. Daily wear only advised, replacement daily/monthly with education to risks of extended wear.  Discussed lens care, compliance and solutions.  PHREV for 1 week contact lens final.    Contact Lens Current Rx            Current Contact Lens Rx #2 (Trial Lens, Dispensed)       Brand Base Curve Diameter Sphere Cylinder [place] Lens Dist [place] Rotation    Right 1-Day Acuvue Moist for Astigmatism 8.5 14.5 [place] -1.75 160 Daily Wear 20/30 OU  10 degrees temporal    Left 1-Day Acuvue Moist for Astigmatism 8.5 14.5 [place] -1.75 180 Daily Wear  5 degrees temporal          Current Contact Lens Rx #3 (Trial Lens, Dispensed)       Brand Base Curve Diameter Sphere Cylinder [place] Lens Dist [place] Rotation    Right Air Optix Aqua for Astigmatism 8.7 14.3 [place] -1.75 160 Daily Wear 20/30 OU 10 degrees temporal    Left Air Optix Aqua for Astigmatism 8.7 14.3 [place] -1.75 180 Daily Wear  5 degrees temporal                  1 week PHREV for CL final. Pt to decide between monthly and daily lens    Follow-up  in about 1 year (around 1/17/2019) for Annual eye exam, Contact Lens Follow Up.

## 2018-01-31 DIAGNOSIS — M17.11 RIGHT KNEE DJD: Primary | ICD-10-CM

## 2018-02-02 ENCOUNTER — CLINICAL SUPPORT (OUTPATIENT)
Dept: REHABILITATION | Facility: HOSPITAL | Age: 38
End: 2018-02-02
Attending: ORTHOPAEDIC SURGERY
Payer: COMMERCIAL

## 2018-02-02 DIAGNOSIS — M25.561 CHRONIC PAIN OF RIGHT KNEE: ICD-10-CM

## 2018-02-02 DIAGNOSIS — R26.89 ANTALGIC GAIT: ICD-10-CM

## 2018-02-02 DIAGNOSIS — G89.29 CHRONIC PAIN OF RIGHT KNEE: ICD-10-CM

## 2018-02-02 DIAGNOSIS — M62.81 MUSCLE WEAKNESS: ICD-10-CM

## 2018-02-02 DIAGNOSIS — M21.061 ACQUIRED VALGUS DEFORMITY OF KNEE, RIGHT: ICD-10-CM

## 2018-02-02 PROCEDURE — 97161 PT EVAL LOW COMPLEX 20 MIN: CPT | Mod: PN

## 2018-02-02 PROCEDURE — 97110 THERAPEUTIC EXERCISES: CPT | Mod: PN

## 2018-02-02 NOTE — PLAN OF CARE
"Physical Therapy Evaluation    Name: Arleen Powell  Clinic Number: [id number]    Diagnosis:   Encounter Diagnoses   Name Primary?    Muscle weakness     Chronic pain of right knee     Acquired valgus deformity of knee, right     Antalgic gait      Physician: Abimbola Luz III, *  Treatment Orders: PT Eval and Treat    Past Medical History:   Diagnosis Date    Abnormal Pap smear     Abnormal Pap smear of vagina     Hypertension     Pre-eclampsia      No current outpatient prescriptions on file.     No current facility-administered medications for this visit.      Review of patient's allergies indicates:  No Known Allergies  Precautions: Standard     Evaluation Date: 2/2/2018  Visit # authorized: 30  Authorization period: 12/31/2018     Promise Bacon is a 38 y.o. female that presents to [facility] outpatient clinic secondary to right knee pain. Patient indicates sharp pains in the knee. Patient indicates that the pain has been been going on for about 3 to 4 months      Patient c/o: intermittent symptoms   Radicular symptoms: none   Onset: gradual but the pain has stayed relatively the same in the last 3 -4 months    Pain Scale: Rates pain on a scale of 0-10 to be 5 at worst; 0 currently; 0 at best .  Aggravating factors: Sitting for long periods,   Relieving factors: Patient indicates that walking makes her knee feel better. Patient indicates that it is when she has been sitting for long periods   Previous treatment: none   Imaging: x-ray of knee performed 1/9/2018 indicates "Mild medial joint space narrowing bilaterally."   Past surgical history: none   Functional deficits: patient indicates that she could do whatever, she does not have any limitations until she tries   Prior level of function: IND, patient was not having symptoms 3-4 months ago   Occupation: Works in the ED, work duties include: standing   Environment: Patient indicates no problem walking up stairs   No cultural or " spiritual barriers identified to treatment or learning.  Activity level/Participation: Sedentary other than work duties   Patient's goals: Not have pain in knee. Patient indicates interest to engage in walking program       Objective     Observation:   Gait: Antalgic    Alignment: Valgus deformity present on right knee     Range of Motion:   Knee Left active   Flexion 115   Extension WFL     Knee Right active   Flexion 105   Extension WFL     Hip flexion: 90 degrees bilaterally     Lower Extremity Strength   Right LE  Left LE   Knee extension: 4+/5 Knee extension: 5/5   Knee flexion: 5/5 Knee flexion: 5/5   Hip flexion: 5/5 Hip flexion: 5/5   Hip extension:  5/5 Hip extension: 5/5   Hip abduction: 4/5 Hip abduction: 5/5   Hip adduction: 5/5 Hip adduction 5/5   Ankle dorsiflexion: 4+/5 Ankle dorsiflexion: 5/5   Ankle plantarflexion: 4/5 Ankle plantarflexion: 4/5     Medial rotation of the foot caused pain in the knee during ila's test     Special Tests:   Right Left   Valgus Stress Test Negative Negative   Varus Stress test Negative Negative   Lachman's test NT NT   Posterior Drawer Negative Negative    Anterior Drawer Negative Negative   Ila's Test Positive  Negative   Apley's Compression Negative Negative   Apley's Distraction Negative Negative     Joint Mobility:    Patellar sup./inf: Limited Bilaterally    Patellar med/lat: Limited Bilaterally     Palpation:   Crepitus: Present in right knee joint    Quad contraction: Fair     Sensation: Gross light touch intact bilaterally in LE      Flexibility:      Right Left   Ely's Test  Positive Positive   Supine 90/90 Negative Negative   Any's Test Negative Negative       Functional Limitations Reports - G Codes  Category: Mobility   Intake 1% Current Status CI -    Predicted 9% Goal Status+ CI -     PT Evaluation Completed? Yes  Discussed Plan of Care with patient: Yes    TREATMENT:  Arleen received therapeutic exercises to develop strength and  endurance, flexibility for 15 minutes including:  Quad sets 20 reps 5 sec   SLR 2 sets of 10 reps  SL hip abduction 2 sets of 10 reps  Supine quad stretch c strap 2 times 1 minute     HEP provided: Patient was given the above exercises to be completed 3-4 times per week   Instructed pt. Regarding: Proper technique with all exercises. Pt demo good understanding of the education provided. Arleen demonstrated good return demonstration of activities.      Assessment     This is a 38 y.o. female referred to outpatient physical therapy and presents with a medical diagnosis of Right knee DJD and demonstrates limitations as described in the problem list. Patient presents to clinic with the presence of right knee valgus deformity. Patient also has the presence of quad tightness that cause compression of the knee joint especially when sitting down for long periods. Patient will benefit from exercises that improve motor control of hip and knee to reduce compression forces when standing and sitting. Patient will engage in mat exercises to improve proper activation of muscles of the hip and then will be challenged with standing activities to further improve motor control. Patient will benefit from physcial therapy services in order to maximize pain free and/or functional use of right knee. The following goals were discussed with the patient and patient is in agreement with them as to be addressed in the treatment plan. Patient was given a HEP consisting of exercises listed above. Patient verbally understood the instructions as they were given and demonstrated proper form and technique during therapy. Patient was advised to perform these exercises free of pain, and to stop performing them if pain occurs. Patient would benefit from skilled PT to address above stated problems, as well as, achieve pt goals within a timely manner. Patient has set realistic goals and has verbalized good understanding and agreement with reported  diagnosis, prognosis and treatment. Patient demonstrates no additional cultural, spiritual or educational need and currently has no barriers to learning.      History  Co-morbidities and personal factors that may impact the plan of care Examination  Body Structures and Functions, activity limitations and participation restrictions that may impact the plan of care Clinical Presentation   Decision Making/ Complexity Score   Co-morbidities:   Abnormal Pap smear   Abnormal Pap smear of vagina  Hypertension   Pre-eclampsia     Personal Factors:   Age 38  Occupation: Worker in ED  Lifestyle: Sedentary except for work on occassions    Attitudes: A little discouraged and motivated    Body Regions: LE     Body Systems: Musculoskeletal (symmetry, ROM, strength, flexibility, joint mobility), Neuromuscular (coordination, posture, balance, gait, transfers, motor control/learning), Cardiovascular (endurance)    Activity limitations: Patient indicates that sitting causes her the most discomfort     Participation Restrictions: Patient indicates that her work duties are impacted when she feels pain in her knee following long bouts of sitting    Stable clinical presentation with changing clinical characteristics    Pain: 5/10 at worse pain    Complexity: Low     Functional Outcome measure  FOTO limitation: 1 % disability       Prognosis: Good    Anticipated barriers to physical therapy: Excess soft tissue     Medical necessity is demonstrated by the following IMPAIRMENTS/PROBLEM LIST:   1)Increase in pain level limiting function    2) Antalgic gait   3) Decreased right knee ROM   4) Muscle weakness    5)Lack of HEP    GOALS: Short Term Goals:  5 weeks  1. Patient will be able to report decreased in active right knee pain  <   / =  3  /10  to increase tolerance for sitting and word duties.    2. Patient will be able to report a 40% improvement in sitting tolerance at increased duration.   3. Patient will demonstrate an increased MMT  in right hip abduction to 4+/5  to increase tolerance for standing to reduce medial joint compression.   4. Patient will be able to demonstrate an increase in 5 degrees of right knee flexion ROM to improve mobility and sitting tolerance for work.   5. Patient will be able to tolerate HEP to improve ROM and independence with ADL's    Long Term Goals: 12 weeks  1. Patient will be able to report decreased in active right knee pain  <   / =  1  /10  to increase tolerance for sitting and word duties.    2. Patient will be able to report a 60% improvement in sitting tolerance at increased duration.   3. Patient will demonstrate an increased MMT in right LE strength to 5/5  to increase tolerance for standing to reduce medial joint compression.   4. Patient will be able to demonstrate an increase right knee flexion ROM to 115 degrees to improve mobility and sitting tolerance for work.   5. Patient to be Independent with HEP to improve ROM and independence with ADL's        Plan     Patient will be treated by physical therapy 1-3 times a week for 12 weeks for Electrical Stimulation PRN, Iontophoresis (with dexamethasone PRN), Manual Therapy, Moist Heat/ Ice, Neuromuscular Re-ed, Patient Education, Therapeutic Activites, Therapeutic Exercise and Other therapeutic taping, dry needling, aquatic therapy to achieve established goals. Arleen may at times be seen by a PTA as part of the Rehab Team.      Cont PT for 12 weeks.      I certify the need for these services furnished under this plan of treatment and while under my care.______________________________ Physician/Referring Practitioner  Date of Signature      Yeny Acosta, PT  2/2/2018

## 2018-02-02 NOTE — PROGRESS NOTES
See full Physical Therapy Evaluation in POC     Review of patient's allergies indicates:  No Known Allergies  Precautions: Standard     Evaluation Date: 2/2/2018  Visit # authorized: 30  Authorization period: 12/31/2018     Functional Limitations Reports - G Codes  Category: Mobility   Intake 1% Current Status CI -    Predicted 9% Goal Status+ CI -     TREATMENT:  Arleen received therapeutic exercises to develop strength and endurance, flexibility for 15 minutes including:  Quad sets 20 reps 5 sec   SLR 2 sets of 10 reps  SL hip abduction 2 sets of 10 reps  Supine quad stretch c strap 2 times 1 minute   Seated quad stretch 10 times 10 seconds     HEP provided: Patient was given the above exercises to be completed 3-4 times per week, including physical therapy sessions   Instructed pt. Regarding: Proper technique with all exercises. Pt demo good understanding of the education provided. Arleen demonstrated good return demonstration of activities.    Prognosis: Good    Anticipated barriers to physical therapy: Excess soft tissue     Medical necessity is demonstrated by the following IMPAIRMENTS/PROBLEM LIST:   1)Increase in pain level limiting function    2) Antalgic gait   3) Decreased right knee ROM   4) Muscle weakness    5)Lack of HEP    GOALS: Short Term Goals:  5 weeks  1. Patient will be able to report decreased in active right knee pain  <   / =  3  /10  to increase tolerance for sitting and word duties.    2. Patient will be able to report a 40% improvement in sitting tolerance at increased duration.   3. Patient will demonstrate an increased MMT in right hip abduction to 4+/5  to increase tolerance for standing to reduce medial joint compression.   4. Patient will be able to demonstrate an increase in 5 degrees of right knee flexion ROM to improve mobility and sitting tolerance for work.   5. Patient will be able to tolerate HEP to improve ROM and independence with ADL's    Long Term Goals: 12  weeks  1. Patient will be able to report decreased in active right knee pain  <   / =  1  /10  to increase tolerance for sitting and word duties.    2. Patient will be able to report a 60% improvement in sitting tolerance at increased duration.   3. Patient will demonstrate an increased MMT in right LE strength to 5/5  to increase tolerance for standing to reduce medial joint compression.   4. Patient will be able to demonstrate an increase right knee flexion ROM to 115 degrees to improve mobility and sitting tolerance for work.   5. Patient to be Independent with HEP to improve ROM and independence with ADL's

## 2018-02-06 ENCOUNTER — CLINICAL SUPPORT (OUTPATIENT)
Dept: REHABILITATION | Facility: HOSPITAL | Age: 38
End: 2018-02-06
Attending: ORTHOPAEDIC SURGERY
Payer: COMMERCIAL

## 2018-02-06 DIAGNOSIS — M21.061 ACQUIRED VALGUS DEFORMITY OF KNEE, RIGHT: ICD-10-CM

## 2018-02-06 DIAGNOSIS — G89.29 CHRONIC PAIN OF RIGHT KNEE: ICD-10-CM

## 2018-02-06 DIAGNOSIS — M62.81 MUSCLE WEAKNESS: ICD-10-CM

## 2018-02-06 DIAGNOSIS — M25.561 CHRONIC PAIN OF RIGHT KNEE: ICD-10-CM

## 2018-02-06 DIAGNOSIS — R26.89 ANTALGIC GAIT: ICD-10-CM

## 2018-02-06 PROCEDURE — 97110 THERAPEUTIC EXERCISES: CPT | Mod: PN

## 2018-02-06 NOTE — PROGRESS NOTES
Physical Therapy Daily Note     Name: Arleen Powell  Clinic Number: [id number]  Diagnosis:   Encounter Diagnoses   Name Primary?    Muscle weakness     Chronic pain of right knee     Acquired valgus deformity of knee, right     Antalgic gait      Physician: Abimbola Luz III, *  Precautions: Standard  Visit #: 2 of 30  RATLIFF: 12/31/18  PTA Visit #: 1  Time In: 0837  Time Out: 0920  Total Treatment Time: 43 mins (1:1 with PTA for 23 mins of treatment session)    Subjective     Patient reports: Arleen reports that her main complaint is the stiffness throughout her Right knee. Patient denies any pain this morning.  Pain Scale: Arleen rates pain on a scale of 0-10 to be 0 currently, R knee.    Objective     Arleen received individual therapeutic exercises to develop strength, endurance, ROM, flexibility, posture and core stabilization for 43 minutes including:  Nustep: 8 minutes (collected subjective data, reviewed HEP)  Quad sets: 20 x 5 sec hold  SAQ: 2 x 10 x 3 sec hold  SLR: 2 x 10   HL ball squeeze: 3 minutes x 3 sec hold  HL hip abduction: GTB x 20  SL Hip Abduction: 2 x 10  SL Clams: 2 x 10 GTB  LAQ with ball squeeze: 2 x 10 x 3 sec hold  Supine quad stretch with strap: 2 x 1 minute    Standing heel raises: 2 x 10  Standing hip abduction: 2 x 10 ea.  Mini squats: 2 x 10    Arleen received the following manual therapy techniques: none    The patient received the following supervised modalities after being cleared for contradictions: cold pack to Right knee x 00 minutes post treatment. - not today, pt declined    Written Home Exercises Provided: reviewed from initial evaluation  Pt demo good understanding of the education provided. Arleen demonstrated good return demonstration of activities.     Education provided re:   Arleen verbalized good understanding of education provided.   No spiritual or educational barriers to learning  provided    Assessment     Arleen tolerated treatment session well today. Patient with good tolerance to initiation of therapeutic exercises with appropriate training effect achieved. Patient reported pain with full extension of Right knee during SAQ's, however was able to perform in a pain free range.  This is a 38 y.o. female referred to outpatient physical therapy and presents with a medical diagnosis of Right knee DJD and demonstrates limitations as described in the problem list. Pt prognosis is Good. Pt will continue to benefit from skilled outpatient physical therapy to address the deficits listed in the problem list, provide pt/family education and to maximize pt's level of independence in the home and community environment.     Goals as follows:  Short Term Goals:  5 weeks  1. Patient will be able to report decreased in active right knee pain  <   / =  3  /10  to increase tolerance for sitting and word duties.    2. Patient will be able to report a 40% improvement in sitting tolerance at increased duration.   3. Patient will demonstrate an increased MMT in right hip abduction to 4+/5  to increase tolerance for standing to reduce medial joint compression.   4. Patient will be able to demonstrate an increase in 5 degrees of right knee flexion ROM to improve mobility and sitting tolerance for work.   5. Patient will be able to tolerate HEP to improve ROM and independence with ADL's     Plan     Certification Period: 2/2/18 to 4/27/18 (PN due by 3/2/18)    Continue with established Plan of Care towards PT goals.    Therapist: Joaquina Diaz, PTA  2/6/2018

## 2018-02-21 ENCOUNTER — TELEPHONE (OUTPATIENT)
Dept: OPTOMETRY | Facility: CLINIC | Age: 38
End: 2018-02-21

## 2018-02-21 NOTE — TELEPHONE ENCOUNTER
Contact lens Rx released to pt. Daily wear only advised, replacement daily. RTC yearly contact lens follow up.       Contact Lens Final Rx     Final Contact Lens Rx       Brand Base Curve Diameter Sphere Cylinder Axis Lens    Right 1-Day Acuvue Moist for Astigmatism 8.5 14.5 [place] -1.75 160 Daily Wear    Left 1-Day Acuvue Moist for Astigmatism 8.5 14.5 [place] -1.75 180 Daily Wear    Expiration Date:  2/22/2019    Replacement:  Daily    Wearing Schedule:  Daily wear

## 2019-02-28 ENCOUNTER — OFFICE VISIT (OUTPATIENT)
Dept: OPTOMETRY | Facility: CLINIC | Age: 39
End: 2019-02-28
Payer: COMMERCIAL

## 2019-02-28 DIAGNOSIS — Z01.00 EXAMINATION OF EYES AND VISION: Primary | ICD-10-CM

## 2019-02-28 DIAGNOSIS — H52.223 REGULAR ASTIGMATISM OF BOTH EYES: ICD-10-CM

## 2019-02-28 DIAGNOSIS — Z46.0 ENCOUNTER FOR FITTING OR ADJUSTMENT OF SPECTACLES OR CONTACT LENSES: Primary | ICD-10-CM

## 2019-02-28 PROCEDURE — 92015 PR REFRACTION: ICD-10-PCS | Mod: S$GLB,,, | Performed by: OPTOMETRIST

## 2019-02-28 PROCEDURE — 92310 PR CONTACT LENS FITTING (NO CHANGE): ICD-10-PCS | Mod: ,,, | Performed by: OPTOMETRIST

## 2019-02-28 PROCEDURE — 99499 UNLISTED E&M SERVICE: CPT | Mod: S$GLB,,, | Performed by: OPTOMETRIST

## 2019-02-28 PROCEDURE — 99499 NO LOS: ICD-10-PCS | Mod: S$GLB,,, | Performed by: OPTOMETRIST

## 2019-02-28 PROCEDURE — 99999 PR PBB SHADOW E&M-EST. PATIENT-LVL II: ICD-10-PCS | Mod: PBBFAC,,, | Performed by: OPTOMETRIST

## 2019-02-28 PROCEDURE — 99999 PR PBB SHADOW E&M-EST. PATIENT-LVL II: CPT | Mod: PBBFAC,,, | Performed by: OPTOMETRIST

## 2019-02-28 PROCEDURE — 92014 COMPRE OPH EXAM EST PT 1/>: CPT | Mod: S$GLB,,, | Performed by: OPTOMETRIST

## 2019-02-28 PROCEDURE — 92310 CONTACT LENS FITTING OU: CPT | Mod: ,,, | Performed by: OPTOMETRIST

## 2019-02-28 PROCEDURE — 92014 PR EYE EXAM, EST PATIENT,COMPREHESV: ICD-10-PCS | Mod: S$GLB,,, | Performed by: OPTOMETRIST

## 2019-02-28 PROCEDURE — 92015 DETERMINE REFRACTIVE STATE: CPT | Mod: S$GLB,,, | Performed by: OPTOMETRIST

## 2019-02-28 NOTE — PROGRESS NOTES
Subjective:       Patient ID: Arleen Powell is a 39 y.o. female      Chief Complaint   Patient presents with    Concerns About Ocular Health    Contact Lens Follow Up     History of Present Illness  Dls: 1/17/18 Dr. Ott     40 y/o female presents today for ocular health check.  Pt states no changes in vision.   Pt wears scls.   Pt wants new rx for cls and glasses.     No tearing  No itching   No burning  No pain  No ha's  No floaters  No flashes    Eye meds  None    Wearing acuvue 1 day moist. Pt occasionally sleeps in them. Replaces lenses daily        Assessment/Plan:     1. Examination of eyes and vision  Good ocular health OU    2. Regular astigmatism of both eyes  Educated patient on refractive error and discussed lens options. Dispensed updated spectacle Rx. Educated about adaptation period to new specs.    Eyeglass Final Rx     Eyeglass Final Rx       Sphere Cylinder Axis    Right -1.75 +2.00 075    Left -1.25 +1.25 095    Expiration Date:  2/29/2020                Contact lens Rx released to pt. Daily wear only advised, replacement daily with education to risks of extended wear.  Discussed lens care, compliance and solutions. RTC yearly contact lens follow up.     Contact Lens Final Rx     Final Contact Lens Rx       Brand Base Curve Diameter Sphere Cylinder Axis Lens    Right 1-Day Acuvue Moist for Astigmatism 8.5 14.5 [place] -1.75 170 Daily Wear    Left 1-Day Acuvue Moist for Astigmatism 8.5 14.5 [place] -1.25 180 Daily Wear    Expiration Date:  2/29/2020    Replacement:  Daily    Wearing Schedule:  Daily wear                  Follow-up in about 1 year (around 2/28/2020) for Annual eye exam, Contact Lens Follow Up.

## 2019-08-08 ENCOUNTER — PATIENT OUTREACH (OUTPATIENT)
Dept: ADMINISTRATIVE | Facility: HOSPITAL | Age: 39
End: 2019-08-08

## 2019-08-08 NOTE — PROGRESS NOTES
Reached out to pt in regards to overdue HM no voicemail available/unavailable. Letter  mailed out in regards to scheduling office visit.

## 2020-01-31 ENCOUNTER — TELEPHONE (OUTPATIENT)
Dept: FAMILY MEDICINE | Facility: CLINIC | Age: 40
End: 2020-01-31

## 2020-01-31 NOTE — TELEPHONE ENCOUNTER
Patient needs to establish with a new PCP. Patient was scheduled with FARIDA Greenberg. Patient states she needs to check her work schedule and then she can reschedule her Establish Care/Annual visit.       *PT NEED OV WITH A DOCTOR TO EST CARE.

## 2020-02-17 ENCOUNTER — HOSPITAL ENCOUNTER (OUTPATIENT)
Dept: RADIOLOGY | Facility: HOSPITAL | Age: 40
Discharge: HOME | End: 2020-02-17
Attending: OBSTETRICS & GYNECOLOGY
Payer: COMMERCIAL

## 2020-02-17 ENCOUNTER — OFFICE VISIT (OUTPATIENT)
Dept: OBSTETRICS AND GYNECOLOGY | Facility: CLINIC | Age: 40
End: 2020-02-17
Payer: COMMERCIAL

## 2020-02-17 VITALS — WEIGHT: 293 LBS | HEIGHT: 68 IN | BODY MASS INDEX: 44.41 KG/M2

## 2020-02-17 VITALS
WEIGHT: 293 LBS | DIASTOLIC BLOOD PRESSURE: 64 MMHG | SYSTOLIC BLOOD PRESSURE: 132 MMHG | BODY MASS INDEX: 44.41 KG/M2 | HEIGHT: 68 IN

## 2020-02-17 DIAGNOSIS — Z01.419 VISIT FOR GYNECOLOGIC EXAMINATION: Primary | ICD-10-CM

## 2020-02-17 DIAGNOSIS — N91.2 AMENORRHEA: ICD-10-CM

## 2020-02-17 DIAGNOSIS — Z12.31 BREAST CANCER SCREENING BY MAMMOGRAM: ICD-10-CM

## 2020-02-17 DIAGNOSIS — Z12.39 BREAST CANCER SCREENING: ICD-10-CM

## 2020-02-17 LAB
B-HCG UR QL: NEGATIVE
CTP QC/QA: YES

## 2020-02-17 PROCEDURE — 99386 PR PREVENTIVE VISIT,NEW,40-64: ICD-10-PCS | Mod: S$GLB,,, | Performed by: OBSTETRICS & GYNECOLOGY

## 2020-02-17 PROCEDURE — 3075F PR MOST RECENT SYSTOLIC BLOOD PRESS GE 130-139MM HG: ICD-10-PCS | Mod: CPTII,S$GLB,, | Performed by: OBSTETRICS & GYNECOLOGY

## 2020-02-17 PROCEDURE — 87624 HPV HI-RISK TYP POOLED RSLT: CPT

## 2020-02-17 PROCEDURE — 77067 SCR MAMMO BI INCL CAD: CPT | Mod: 26,,, | Performed by: RADIOLOGY

## 2020-02-17 PROCEDURE — 77063 MAMMO DIGITAL SCREENING BILAT WITH TOMOSYNTHESIS_CAD: ICD-10-PCS | Mod: 26,,, | Performed by: RADIOLOGY

## 2020-02-17 PROCEDURE — 77067 MAMMO DIGITAL SCREENING BILAT WITH TOMOSYNTHESIS_CAD: ICD-10-PCS | Mod: 26,,, | Performed by: RADIOLOGY

## 2020-02-17 PROCEDURE — 81025 URINE PREGNANCY TEST: CPT | Mod: S$GLB,,, | Performed by: OBSTETRICS & GYNECOLOGY

## 2020-02-17 PROCEDURE — 3078F DIAST BP <80 MM HG: CPT | Mod: CPTII,S$GLB,, | Performed by: OBSTETRICS & GYNECOLOGY

## 2020-02-17 PROCEDURE — 77067 SCR MAMMO BI INCL CAD: CPT | Mod: TC,PO

## 2020-02-17 PROCEDURE — 81025 POCT URINE PREGNANCY: ICD-10-PCS | Mod: S$GLB,,, | Performed by: OBSTETRICS & GYNECOLOGY

## 2020-02-17 PROCEDURE — 3075F SYST BP GE 130 - 139MM HG: CPT | Mod: CPTII,S$GLB,, | Performed by: OBSTETRICS & GYNECOLOGY

## 2020-02-17 PROCEDURE — 99386 PREV VISIT NEW AGE 40-64: CPT | Mod: S$GLB,,, | Performed by: OBSTETRICS & GYNECOLOGY

## 2020-02-17 PROCEDURE — 77063 BREAST TOMOSYNTHESIS BI: CPT | Mod: 26,,, | Performed by: RADIOLOGY

## 2020-02-17 PROCEDURE — 99999 PR PBB SHADOW E&M-EST. PATIENT-LVL III: CPT | Mod: PBBFAC,,, | Performed by: OBSTETRICS & GYNECOLOGY

## 2020-02-17 PROCEDURE — 3078F PR MOST RECENT DIASTOLIC BLOOD PRESSURE < 80 MM HG: ICD-10-PCS | Mod: CPTII,S$GLB,, | Performed by: OBSTETRICS & GYNECOLOGY

## 2020-02-17 PROCEDURE — 99999 PR PBB SHADOW E&M-EST. PATIENT-LVL III: ICD-10-PCS | Mod: PBBFAC,,, | Performed by: OBSTETRICS & GYNECOLOGY

## 2020-02-17 PROCEDURE — 88175 CYTOPATH C/V AUTO FLUID REDO: CPT

## 2020-02-17 RX ORDER — VALACYCLOVIR HYDROCHLORIDE 500 MG/1
500 TABLET, FILM COATED ORAL 2 TIMES DAILY
Qty: 10 TABLET | Refills: 3 | Status: SHIPPED | OUTPATIENT
Start: 2020-02-17 | End: 2020-07-28

## 2020-02-17 NOTE — PROGRESS NOTES
HISTORY OF PRESENT ILLNESS:    Arleen Powell is a 40 y.o. female, , Patient's last menstrual period was 2020 (approximate).,  presents for a routine exam and has no complaints. NEW PATIENT, \[facility] [place] [facility]\"" CARE.  COTESTING.  MAMMO ORDERED.  DECLINES CONTRACEPTION, HAS BEEN IN A COMMITTED RELATIONSHIP PAST 10 YEARS, AND HE DOES NOT HAVE CHILDREN.  HAS NOT HAD HSV FLARES FOR VERY LONG TIME BUT PRESCRIPTION EPISODIC VALTREX P.R.N. TO PHARMACY IN CASE NEEDED.  WORKS PATIENT [facility]    LAST VISIT VINCE 2016:  HPI:  Pt seen recently for non-painful vulvar lesions.  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  Pt states she has had lesions like this before and they occur about twice per year.    Past Medical History:   Diagnosis Date    Abnormal Pap smear     Abnormal Pap smear of vagina     Hypertension     Pre-eclampsia        Past Surgical History:   Procedure Laterality Date    PELVIC LAPAROSCOPY Right 2013    SALPINJECTOMY(ECTOPIC)       MEDICATIONS AND ALLERGIES:    No current outpatient medications on file.    Review of patient's allergies indicates:  No Known Allergies    Family History   Problem Relation Age of Onset    No Known Problems Father     No Known Problems Mother     No Known Problems Sister     No Known Problems Brother     No Known Problems Maternal Aunt     No Known Problems Maternal Uncle     No Known Problems Paternal Aunt     No Known Problems Paternal Uncle     No Known Problems Maternal Grandmother     No Known Problems Maternal Grandfather     No Known Problems Paternal Grandmother     No Known Problems Paternal Grandfather     Amblyopia Neg Hx     Blindness Neg Hx     Cancer Neg Hx     Cataracts Neg Hx     Diabetes Neg Hx     Glaucoma Neg Hx     Hypertension Neg Hx     Macular degeneration Neg Hx     Retinal detachment Neg Hx     Strabismus Neg Hx     Stroke Neg Hx     Thyroid disease Neg Hx        Social History     Socioeconomic History     Marital status: Single     Spouse name: Not on file    Number of children: Not on file    Years of education: Not on file    Highest education level: Not on file   Occupational History    Not on file   Social Needs    Financial resource strain: Not on file    Food insecurity:     Worry: Not on file     Inability: Not on file    Transportation needs:     Medical: Not on file     Non-medical: Not on file   Tobacco Use    Smoking status: Never Smoker    Smokeless tobacco: Never Used   Substance and Sexual Activity    Alcohol use: No     Alcohol/week: 0.0 standard drinks    Drug use: No    Sexual activity: Yes     Partners: Male     Birth control/protection: None   Lifestyle    Physical activity:     Days per week: Not on file     Minutes per session: Not on file    Stress: Not on file   Relationships    Social connections:     Talks on phone: Not on file     Gets together: Not on file     Attends Islam service: Not on file     Active member of club or organization: Not on file     Attends meetings of clubs or organizations: Not on file     Relationship status: Not on file   Other Topics Concern    Not on file   Social History Narrative    Not on file       COMPREHENSIVE GYN HISTORY:  PAP History: Denies abnormal Paps.  Infection History: Denies STDs. Denies PID.  Benign History: Denies uterine fibroids. Denies ovarian cysts. Denies endometriosis. Denies other conditions.  Cancer History: Denies cervical cancer. Denies uterine cancer or hyperplasia. Denies ovarian cancer. Denies vulvar cancer or pre-cancer. Denies vaginal cancer or pre-cancer. Denies breast cancer. Denies colon cancer.  Sexual Activity History: Reports currently being sexually active  Menstrual History: Monthly, mild-moderate.  Contraception:no method    ROS:  GENERAL: No weight changes. No swelling. No fatigue. No fever.  CARDIOVASCULAR: No chest pain. No shortness of breath. No leg cramps.   NEUROLOGICAL: No headaches. No vision  "changes.  BREASTS: No pain. No lumps. No discharge.  ABDOMEN: No pain. No nausea. No vomiting. No diarrhea. No constipation.  REPRODUCTIVE: No abnormal bleeding.   VULVA: No pain. No lesions. No itching.  VAGINA: No relaxation. No itching. No odor. No discharge. No lesions.  URINARY: No incontinence. No nocturia. No frequency. No dysuria.    /64   Ht 5' 8" (1.727 m)   Wt (!) 139.7 kg (307 lb 15.7 oz)   LMP 01/07/2020 (Approximate)   BMI 46.83 kg/m²     PE:  APPEARANCE: Well nourished, well developed, in no acute distress.  AFFECT: WNL, alert and oriented x 3.  SKIN: No acne or hirsutism.  NECK: Neck symmetric, without masses or thyromegaly.  NODES: No inguinal, cervical, axillary or femoral lymph node enlargement.  CHEST: Good respiratory effort.   ABDOMEN: Soft. No tenderness or masses. No hepatosplenomegaly. No hernias.  BREASTS: Symmetrical, no skin changes, visible lesions, palpable masses or nipple discharge bilaterally.  PELVIC: External female genitalia without lesions.  Female hair distribution. Adequate perineal body, Normal urethral meatus. Vagina moist and well rugated without lesions or discharge.  No significant cystocele or rectocele present. Cervix pink without lesions, discharge or tenderness. Uterus is normal size, regular, mobile and nontender. Adnexa without masses or tenderness.  EXTREMITIES: No edema    PROCEDURES:  Pap    DIAGNOSIS:  1. Visit for gynecologic examination  Liquid-Based Pap Smear, Screening    HPV High Risk Genotypes, PCR   2. Amenorrhea  POCT Urine Pregnancy   3. Breast cancer screening  Mammo Digital Screening Bilat w/ Nick       LABS AND TESTS ORDERED:    MEDICATIONS PRESCRIBED:    COUNSELING:   The patient was counseled today on ACS PAP guidelines, with recommendations for yearly pelvic exams unless their uterus, cervix, and ovaries were removed for benign reasons; in that case, examinations every 3-5 years are recommended.  The patient was also counseled regarding " monthly breast self-examination, routine STD screening for at-risk populations, prophylactic immunizations for transmitted infections such as  HPV, Pertussis, or Influenza as appropriate, and yearly mammograms when indicated by ACS guidelines.  She was advised to see her primary care physician for all other health maintenance.    FOLLOW-UP with me annually.

## 2020-02-20 LAB
HPV HR 12 DNA SPEC QL NAA+PROBE: NEGATIVE
HPV16 AG SPEC QL: NEGATIVE
HPV18 DNA SPEC QL NAA+PROBE: NEGATIVE

## 2020-03-18 ENCOUNTER — PATIENT MESSAGE (OUTPATIENT)
Dept: OBSTETRICS AND GYNECOLOGY | Facility: CLINIC | Age: 40
End: 2020-03-18

## 2020-03-18 LAB
FINAL PATHOLOGIC DIAGNOSIS: NORMAL
Lab: NORMAL

## 2020-05-26 ENCOUNTER — TELEPHONE (OUTPATIENT)
Dept: ORTHOPEDICS | Facility: CLINIC | Age: 40
End: 2020-05-26

## 2020-05-26 NOTE — TELEPHONE ENCOUNTER
----- Message from Rosie Mcmillan PA-C sent at 5/26/2020  8:05 AM CDT -----  She has appt on 6/5 for bilateral knee pain and needs XRs of bilateral knees prior to seeing me.     Thanks!

## 2020-05-26 NOTE — TELEPHONE ENCOUNTER
Pt had xrays done at another facility, she will call them to have them fax it to us, I gave her the fax number

## 2020-05-26 NOTE — PROGRESS NOTES
Subjective:      Patient ID: Arleen Powell is a 40 y.o. female.    Chief Complaint: No chief complaint on file.      [facility]  (Donnie)    She complains of chronic bilateral knee pain x years. Left knee pain = right knee pain. Pain is worse with prolonged standing/walking. Pain is better with rest. She rates her pain as a 3 on a scale of 1-10. Pain is sharp in nature. No locking, catching, or giving way.     She did PT in the past with some improvement. No injections, surgery, or bracing for her knees. She takes prn motrin with some improvement.     She has started the process to have bariatric surgery for weight loss.       Past Medical History:   Diagnosis Date    Abnormal Pap smear     Abnormal Pap smear of vagina     Hypertension     Pre-eclampsia          Current Outpatient Medications:     ibuprofen 200 mg Cap, Take 4 tablets by mouth 2 (two) times daily as needed., Disp: , Rfl:     valACYclovir (VALTREX) 500 MG tablet, Take 1 tablet (500 mg total) by mouth 2 (two) times daily. for 5 days, Disp: 10 tablet, Rfl: 3    Review of patient's allergies indicates:  No Known Allergies      Review of Systems   Constitution: Negative for fever, malaise/fatigue, night sweats, weight gain and weight loss.   HENT: Negative for hearing loss, nosebleeds and odynophagia.    Eyes: Negative for blurred vision and double vision.   Cardiovascular: Negative for chest pain, irregular heartbeat and palpitations.   Respiratory: Negative for cough, hemoptysis, shortness of breath and wheezing.    Endocrine: Negative for cold intolerance and polydipsia.   Hematologic/Lymphatic: Does not bruise/bleed easily.   Skin: Negative for dry skin, poor wound healing, rash and suspicious lesions.   Musculoskeletal:        See HPI for pertinent positives.   Gastrointestinal: Negative for bloating, abdominal pain, constipation, diarrhea, hematochezia, melena, nausea and vomiting.   Genitourinary: Negative for bladder incontinence, dysuria,  hematuria, hesitancy and incomplete emptying.   Neurological: Negative for disturbances in coordination, dizziness, focal weakness, headaches, loss of balance, numbness, paresthesias, seizures and weakness.   Psychiatric/Behavioral: Negative for depression and hallucinations. The patient is not nervous/anxious.          Objective:        /70   Pulse 77   Wt (!) 145.6 kg (321 lb)   SpO2 (!) 18%   BMI 48.81 kg/m²     General    Vitals reviewed.  Constitutional: She is oriented to person, place, and time. She appears well-developed and well-nourished.   Pulmonary/Chest: Effort normal.   Abdominal: She exhibits no distension.   Neurological: She is alert and oriented to person, place, and time.   Psychiatric: She has a normal mood and affect. Her behavior is normal. Judgment and thought content normal.           Body habitus is obese.   The patient walks without a limp.      RIGHT KNEE EXAM:    Resisted SLR negative.   The skin over the knee is intact.  Knee effusion none   Tendernes is located medial  Range of motion- Flexion 120 deg, Extension 0 deg,     Ligament exam:   MCL 1+ laxity   Lachman intact              Post sag intact    LCL intact    Patellar apprehension negative.  Popliteal cyst negative  Patellar crepitation present.  Flexion/pinch negative.    Pulses DP present, PT present.  Motor normal 5/5 strength in all tested muscle groups.   Sensory normal.      LEFT KNEE EXAM:    Resisted SLR negative.   The skin over the knee is intact.  Knee effusion none   Tendernes is located medial  Range of motion- Flexion 120 deg, Extension 0 deg,     Ligament exam:   MCL 1+ laxity   Lachman intact              Post sag intact    LCL intact    Patellar apprehension negative.  Popliteal cyst negative  Patellar crepitation present.  Flexion/pinch negative.    Pulses DP present, PT present.  Motor normal 5/5 strength in all tested muscle groups.   Sensory normal.      XRAY INTERPRETATION:  X-ray report of  bilateral knees (no images available) dated 5/5/20 is personally  reviewed and reads mild/moderate tricompartmental arthritis in both knees with osteophytes on right.         Assessment:       Encounter Diagnoses   Name Primary?    Primary osteoarthritis of left knee Yes    Primary osteoarthritis of right knee           Plan:       Diagnoses and all orders for this visit:    Primary osteoarthritis of left knee  -     Large Joint Aspiration/Injection    Primary osteoarthritis of right knee  -     Large Joint Aspiration/Injection      Chronic bilateral knee pain x years, left knee pain = right knee pain. Pain is worse with prolonged standing/walking. Known mild/moderate tricompartmental arthritis in both knees with osteophytes on right. Treatment options reviewed with patient along with above report of bilateral knee xrays. Following plan made:     - BILATERAL knee injections done without complication. See procedure note.   - Continue prn OTC motrin. Reviewed dosing and side effects. Take with food.   - Given hinged knee brace. Will wear this prn comfort/support with prolonged walking.   - Weight loss encouraged. As above, she is in the process of having bariatric surgery for weight loss.     Follow up in about 3 months (around 9/5/2020).

## 2020-06-05 ENCOUNTER — OFFICE VISIT (OUTPATIENT)
Dept: ORTHOPEDICS | Facility: CLINIC | Age: 40
End: 2020-06-05
Payer: MEDICAID

## 2020-06-05 VITALS
OXYGEN SATURATION: 18 % | SYSTOLIC BLOOD PRESSURE: 126 MMHG | BODY MASS INDEX: 48.81 KG/M2 | HEART RATE: 77 BPM | DIASTOLIC BLOOD PRESSURE: 70 MMHG | WEIGHT: 293 LBS

## 2020-06-05 DIAGNOSIS — M17.12 PRIMARY OSTEOARTHRITIS OF LEFT KNEE: Primary | ICD-10-CM

## 2020-06-05 DIAGNOSIS — M17.11 PRIMARY OSTEOARTHRITIS OF RIGHT KNEE: ICD-10-CM

## 2020-06-05 PROCEDURE — 99204 PR OFFICE/OUTPT VISIT, NEW, LEVL IV, 45-59 MIN: ICD-10-PCS | Mod: 25,S$PBB,, | Performed by: PHYSICIAN ASSISTANT

## 2020-06-05 PROCEDURE — 99204 OFFICE O/P NEW MOD 45 MIN: CPT | Mod: 25,S$PBB,, | Performed by: PHYSICIAN ASSISTANT

## 2020-06-05 PROCEDURE — 99999 PR PBB SHADOW E&M-EST. PATIENT-LVL III: CPT | Mod: PBBFAC,,, | Performed by: PHYSICIAN ASSISTANT

## 2020-06-05 PROCEDURE — 99213 OFFICE O/P EST LOW 20 MIN: CPT | Mod: PBBFAC,PN | Performed by: PHYSICIAN ASSISTANT

## 2020-06-05 PROCEDURE — 20610 PR DRAIN/INJECT LARGE JOINT/BURSA: ICD-10-PCS | Mod: 50,S$PBB,, | Performed by: PHYSICIAN ASSISTANT

## 2020-06-05 PROCEDURE — 20610 DRAIN/INJ JOINT/BURSA W/O US: CPT | Mod: 50,S$PBB,, | Performed by: PHYSICIAN ASSISTANT

## 2020-06-05 PROCEDURE — 99999 PR PBB SHADOW E&M-EST. PATIENT-LVL III: ICD-10-PCS | Mod: PBBFAC,,, | Performed by: PHYSICIAN ASSISTANT

## 2020-06-05 RX ORDER — PHENYLPROPANOLAMINE/CLEMASTINE 75-1.34MG
4 TABLET, EXTENDED RELEASE ORAL 2 TIMES DAILY PRN
COMMUNITY
End: 2022-01-06

## 2020-06-05 RX ORDER — TRIAMCINOLONE ACETONIDE 40 MG/ML
80 INJECTION, SUSPENSION INTRA-ARTICULAR; INTRAMUSCULAR
Status: DISCONTINUED | OUTPATIENT
Start: 2020-06-05 | End: 2020-06-05 | Stop reason: HOSPADM

## 2020-06-05 RX ADMIN — TRIAMCINOLONE ACETONIDE 80 MG: 40 INJECTION, SUSPENSION INTRA-ARTICULAR; INTRAMUSCULAR at 11:06

## 2020-06-05 NOTE — PATIENT INSTRUCTIONS
It was nice to meet you today! I am sorry that you are hurting so much.     You have some wear and tear in your knees and this is likely what is causing your pain.     The injections that I did today should give you some good relief of pain. It is normal to have some increased soreness over the next few days after an injection. Put ice on it and elevate. This will get better.    Wear the knee brace as needed for prolonged walking. This should give you added support and help with pain.     Keep working on weight loss.     I will see you back in 3 months, but please stay in touch and call me if you need anything. You can also send me a message in [facility].     Rosie   [phone]

## 2020-06-05 NOTE — LETTER
June 5, 2020      Syd Gutierrez, FARIDA  [address] [place] [address]           [facility]  [address]  [facility] [address]  Phone: [phone]  Fax: [phone]          Patient: Arleen Powell   MR Number: [id number]   YOB: 1980   Date of Visit: 6/5/2020       Dear Syd Gutierrez:    Thank you for referring Arleen Powell to me for evaluation. Attached you will find relevant portions of my assessment and plan of care.    If you have questions, please do not hesitate to call me. I look forward to following Arleen Powell along with you.    Sincerely,    DOMINIQUE Wangosure  CC:  No Recipients    If you would like to receive this communication electronically, please contact [email or web address] or [phone] to request more information on [employer] Link access.    For providers and/or their staff who would like to refer a patient to [facility], please contact us through our one-stop-shop provider referral line, [facility], at [phone].    If you feel you have received this communication in error or would no longer like to receive these types of communications, please e-mail [email or web address]

## 2020-06-05 NOTE — PROCEDURES
Large Joint Aspiration/Injection  Date/Time: 6/5/2020 11:00 AM  Performed by: Rosie Mcmillan PA-C  Authorized by: Rosie Mcmillan PA-C     Medications:  80 mg triamcinolone acetonide 40 mg/mL     PROCEDURE NOTE:  BILATERAL KNEE INJECTION    I have explained the risks, benefits, and alternatives of the procedure in detail.  The patient voices understanding and all questions have been answered.  The patient agrees to proceed as planned.    After a sterile prep of the skin using chloraprep one step, the area was sprayed with local topical anesthetic and then cleaned with alcohol. Both the right and left knees were injected through an inferior lateral approach with a combination of 2 cc 1% plain xylocaine and 40mg triamcinolone.  The patient is cautioned that immediate relief of pain is secondary to the local anesthetic and will be temporary. After the anesthetic wears off there may be a increase in pain that may last for a few hours or a few days and they should use ice to help alleviate this this pain.     If patient is diabetic, post injection elevation of blood sugar was discussed. Patient is to check blood sugar regularly and call PCP with any issues.     Patient tolerated the procedure well.

## 2020-08-01 ENCOUNTER — CLINICAL SUPPORT (OUTPATIENT)
Dept: URGENT CARE | Facility: CLINIC | Age: 40
End: 2020-08-01
Payer: MEDICAID

## 2020-08-01 DIAGNOSIS — Z01.818 PRE-OP TESTING: ICD-10-CM

## 2020-08-01 PROCEDURE — U0003 INFECTIOUS AGENT DETECTION BY NUCLEIC ACID (DNA OR RNA); SEVERE ACUTE RESPIRATORY SYNDROME CORONAVIRUS 2 (SARS-COV-2) (CORONAVIRUS DISEASE [COVID-19]), AMPLIFIED PROBE TECHNIQUE, MAKING USE OF HIGH THROUGHPUT TECHNOLOGIES AS DESCRIBED BY CMS-2020-01-R: HCPCS

## 2020-08-02 LAB — SARS-COV-2 RNA RESP QL NAA+PROBE: NOT DETECTED

## 2020-08-04 PROBLEM — R10.13 ACUTE EPIGASTRIC PAIN: Status: ACTIVE | Noted: 2020-08-04

## 2020-09-04 NOTE — PROGRESS NOTES
Subjective:      Patient ID: Arleen Powell is a 40 y.o. female.    Chief Complaint: No chief complaint on file.      HPI  (Donnie)    Last seen by me on 6/5/20 for bilateral knee pain. Known mild/moderate tricompartmental arthritis in both knees with osteophytes on right.     She was given bilateral knee injections (6/5/20) and hinged knee sleeves. She is here for follow up.     She had great relief of her knee pain after last injections until about 2 weeks ago when pain returned. Now with constant bilateral knee pain, right = left. Pain is worse with prolonged standing/walking. Pain is better with rest. She rates her pain as a 3 on a scale of 1-10. Pain is sharp in nature. No locking, catching, or giving way. She continues to wear her knee brace as needed.     She did PT in the past with some improvement. No surgery for her knees. She takes prn motrin with some improvement.     She has started the process to have bariatric surgery for weight loss- it is scheduled for November.       Past Medical History:   Diagnosis Date    Abnormal Pap smear     Abnormal Pap smear of vagina     Hypertension     Pre-eclampsia          Current Outpatient Medications:     ibuprofen 200 mg Cap, Take 4 tablets by mouth 2 (two) times daily as needed., Disp: , Rfl:   No current facility-administered medications for this visit.     Facility-Administered Medications Ordered in Other Visits:     lactated ringers infusion, , Intravenous, Continuous, Leroy Velasquez MD    sodium chloride 0.9% flush 3 mL, 3 mL, Intravenous, PRN, Leroy Velasquez MD    Review of patient's allergies indicates:  No Known Allergies      Review of Systems   Constitution: Negative for chills, fever, night sweats and weight gain.   Gastrointestinal: Negative for bowel incontinence, nausea and vomiting.   Genitourinary: Negative for bladder incontinence.   Neurological: Negative for disturbances in coordination and loss of balance.         Objective:         There were no vitals taken for this visit.    Ortho/SPM Exam     Body habitus is obese.   The patient walks without a limp.      RIGHT KNEE EXAM:    Resisted SLR negative.   The skin over the knee is intact.  Knee effusion none   Tendernes is located medial  Range of motion- Flexion 120 deg, Extension 0 deg,     Ligament exam:   MCL 1+ laxity   Lachman intact              Post sag intact    LCL intact    Patellar apprehension negative.  Popliteal cyst negative  Patellar crepitation present.  Flexion/pinch negative.    Pulses DP present, PT present.  Motor normal 5/5 strength in all tested muscle groups.   Sensory normal.      LEFT KNEE EXAM:    Resisted SLR negative.   The skin over the knee is intact.  Knee effusion none   Tendernes is located medial  Range of motion- Flexion 120 deg, Extension 0 deg,     Ligament exam:   MCL 1+ laxity   Lachman intact              Post sag intact    LCL intact    Patellar apprehension negative.  Popliteal cyst negative  Patellar crepitation present.  Flexion/pinch negative.    Pulses DP present, PT present.  Motor normal 5/5 strength in all tested muscle groups.   Sensory normal.        Assessment:       Encounter Diagnoses   Name Primary?    Primary osteoarthritis of left knee Yes    Primary osteoarthritis of right knee           Plan:       Diagnoses and all orders for this visit:    Primary osteoarthritis of left knee  -     Large Joint Aspiration/Injection    Primary osteoarthritis of right knee  -     Large Joint Aspiration/Injection      Chronic bilateral knee pain x years, left knee pain = right knee pain. Pain is worse with prolonged standing/walking. Known mild/moderate tricompartmental arthritis in both knees with osteophytes on right. Treatment options reviewed with patient and following plan made:     - Repeat BILATERAL knee injections done without complication. See procedure note.   - Continue prn OTC motrin. Reviewed dosing and side effects. Take with food.   -  Continue to use hinged knee brace. Will wear this prn comfort/support with prolonged walking.   - Weight loss encouraged. She is scheduled for gastric bypass in November.     Follow up if symptoms worsen or fail to improve.

## 2020-09-08 ENCOUNTER — OFFICE VISIT (OUTPATIENT)
Dept: ORTHOPEDICS | Facility: CLINIC | Age: 40
End: 2020-09-08
Payer: MEDICAID

## 2020-09-08 VITALS — RESPIRATION RATE: 18 BRPM | DIASTOLIC BLOOD PRESSURE: 82 MMHG | HEART RATE: 74 BPM | SYSTOLIC BLOOD PRESSURE: 130 MMHG

## 2020-09-08 DIAGNOSIS — M17.12 PRIMARY OSTEOARTHRITIS OF LEFT KNEE: Primary | ICD-10-CM

## 2020-09-08 DIAGNOSIS — M17.11 PRIMARY OSTEOARTHRITIS OF RIGHT KNEE: ICD-10-CM

## 2020-09-08 PROCEDURE — 99212 OFFICE O/P EST SF 10 MIN: CPT | Mod: PBBFAC,PN | Performed by: PHYSICIAN ASSISTANT

## 2020-09-08 PROCEDURE — 99214 OFFICE O/P EST MOD 30 MIN: CPT | Mod: 25,S$PBB,, | Performed by: PHYSICIAN ASSISTANT

## 2020-09-08 PROCEDURE — 99999 PR PBB SHADOW E&M-EST. PATIENT-LVL II: CPT | Mod: PBBFAC,,, | Performed by: PHYSICIAN ASSISTANT

## 2020-09-08 PROCEDURE — 20610 DRAIN/INJ JOINT/BURSA W/O US: CPT | Mod: 50,S$PBB,, | Performed by: PHYSICIAN ASSISTANT

## 2020-09-08 PROCEDURE — 99214 PR OFFICE/OUTPT VISIT, EST, LEVL IV, 30-39 MIN: ICD-10-PCS | Mod: 25,S$PBB,, | Performed by: PHYSICIAN ASSISTANT

## 2020-09-08 PROCEDURE — 20610 PR DRAIN/INJECT LARGE JOINT/BURSA: ICD-10-PCS | Mod: 50,S$PBB,, | Performed by: PHYSICIAN ASSISTANT

## 2020-09-08 PROCEDURE — 99999 PR PBB SHADOW E&M-EST. PATIENT-LVL II: ICD-10-PCS | Mod: PBBFAC,,, | Performed by: PHYSICIAN ASSISTANT

## 2020-09-08 RX ORDER — TRIAMCINOLONE ACETONIDE 40 MG/ML
80 INJECTION, SUSPENSION INTRA-ARTICULAR; INTRAMUSCULAR
Status: DISCONTINUED | OUTPATIENT
Start: 2020-09-08 | End: 2020-09-08 | Stop reason: HOSPADM

## 2020-09-08 RX ADMIN — TRIAMCINOLONE ACETONIDE 80 MG: 40 INJECTION, SUSPENSION INTRA-ARTICULAR; INTRAMUSCULAR at 07:09

## 2020-09-08 NOTE — PATIENT INSTRUCTIONS
It is always good to see you. I am sorry that you are hurting so much.     The injections that I did today should give you some good relief of pain. It is normal to have some increased soreness over the next few days after an injection. Put ice on it and elevate. This will get better.     Wear the knee braces as needed for prolonged walking. This should give you added support and help with pain.     Continue on motrin as needed to help with pain/inflammation. Take as directed with food.     I will see you back in 3 months, but please stay in touch and call me if you need anything. You can also send me a message in [facility].     Rosie   [phone]

## 2020-09-08 NOTE — PROCEDURES
Large Joint Aspiration/Injection    Date/Time: 9/8/2020 7:00 AM  Performed by: Rosie Mcmlilan PA-C  Authorized by: Rosie Mcmillan PA-C     Consent Done?:  Yes (Verbal)  Timeout: prior to procedure the correct patient, procedure, and site was verified    Medications:  80 mg triamcinolone acetonide 40 mg/mL     PROCEDURE NOTE:  BILATERAL KNEE INJECTION    I have explained the risks, benefits, and alternatives of the procedure in detail.  The patient voices understanding and all questions have been answered.  The patient agrees to proceed as planned.    After a sterile prep of the skin using chloraprep one step, the area was sprayed with local topical anesthetic and then cleaned with alcohol. Both the right and left knees were injected through an inferior lateral approach with a combination of 2 cc 1% plain xylocaine and 40mg triamcinolone.  The patient is cautioned that immediate relief of pain is secondary to the local anesthetic and will be temporary. After the anesthetic wears off there may be a increase in pain that may last for a few hours or a few days and they should use ice to help alleviate this this pain.     If patient is diabetic, post injection elevation of blood sugar was discussed. Patient is to check blood sugar regularly and call PCP with any issues.     Patient tolerated the procedure well.

## 2020-09-17 ENCOUNTER — TELEPHONE (OUTPATIENT)
Dept: ORTHOPEDICS | Facility: CLINIC | Age: 40
End: 2020-09-17

## 2020-10-22 ENCOUNTER — TELEPHONE (OUTPATIENT)
Dept: OBSTETRICS AND GYNECOLOGY | Facility: CLINIC | Age: 40
End: 2020-10-22

## 2020-10-22 NOTE — TELEPHONE ENCOUNTER
Called patient back - ER precautions were discussed patient will call back if bleeding gets heavy..  For now patient will keep scheduled appt.

## 2020-10-22 NOTE — TELEPHONE ENCOUNTER
----- Message from Cici Oliver sent at 10/22/2020 12:51 PM CDT -----  Regarding: Patient Call Back  Who Called:TRUPTI VENTURA [[phone]]    What is the request in detail: Patient states she has been bleeding for a whole states she is fatigued and wanting to be seen as soon as possible very concerned    Can the clinic reply by  [facility]?No    Best Call Back Number:[phone]

## 2020-12-02 NOTE — PROGRESS NOTES
Subjective:      Patient ID: Arleen Powell is a 40 y.o. female.    Chief Complaint: No chief complaint on file.      HPI  (Donnie)    Last seen by me on 9/8/20 for bilateral knee pain. Known mild/moderate tricompartmental arthritis in both knees with osteophytes on right.     She was given bilateral knee injections (9/8/20) and hinged knee sleeves. She is here for follow up.     She had great relief of her knee pain after last injections until about 2 weeks ago when pain returned. Now with constant bilateral knee pain, right = left. Pain is worse with prolonged standing/walking. Pain is better with rest. She rates her pain as a 5 on a scale of 1-10. Pain is sharp in nature. No locking, catching, or giving way.     She continues to wear her knee braces as needed and is on prn motrin. She did PT in the past with some improvement. No surgery for her knees.     She has started the process to have bariatric surgery for weight loss- it is tentatively scheduled for January.       Past Medical History:   Diagnosis Date    Abnormal Pap smear     Abnormal Pap smear of vagina     Hypertension     Pre-eclampsia          Current Outpatient Medications:     ibuprofen 200 mg Cap, Take 4 tablets by mouth 2 (two) times daily as needed., Disp: , Rfl:   No current facility-administered medications for this visit.     Facility-Administered Medications Ordered in Other Visits:     lactated ringers infusion, , Intravenous, Continuous, Leroy Velasquez MD    sodium chloride 0.9% flush 3 mL, 3 mL, Intravenous, PRN, Leroy Velasquez MD    Review of patient's allergies indicates:  No Known Allergies      Review of Systems   Constitution: Negative for chills, fever, night sweats and weight gain.   Gastrointestinal: Negative for bowel incontinence, nausea and vomiting.   Genitourinary: Negative for bladder incontinence.   Neurological: Negative for disturbances in coordination and loss of balance.         Objective:        BP  126/78   Pulse 86   Resp 20   Wt (!) 150.6 kg (332 lb)   BMI 49.03 kg/m²     Ortho/SPM Exam     Body habitus is obese.   The patient walks without a limp.      RIGHT KNEE EXAM:    Resisted SLR negative.   The skin over the knee is intact.  Knee effusion none   Tendernes is located medial  Range of motion- Flexion 120 deg, Extension 0 deg,     Ligament exam:   MCL 1+ laxity   Lachman intact              Post sag intact    LCL intact    Patellar apprehension negative.  Popliteal cyst negative  Patellar crepitation present.  Flexion/pinch negative.    Pulses DP present, PT present.  Motor normal 5/5 strength in all tested muscle groups.   Sensory normal.      LEFT KNEE EXAM:    Resisted SLR negative.   The skin over the knee is intact.  Knee effusion none   Tendernes is located medial  Range of motion- Flexion 120 deg, Extension 0 deg,     Ligament exam:   MCL 1+ laxity   Lachman intact              Post sag intact    LCL intact    Patellar apprehension negative.  Popliteal cyst negative  Patellar crepitation present.  Flexion/pinch negative.    Pulses DP present, PT present.  Motor normal 5/5 strength in all tested muscle groups.   Sensory normal.        Assessment:       Encounter Diagnoses   Name Primary?    Primary osteoarthritis of left knee Yes    Primary osteoarthritis of right knee           Plan:       Diagnoses and all orders for this visit:    Primary osteoarthritis of left knee  -     Large Joint Aspiration/Injection    Primary osteoarthritis of right knee  -     Large Joint Aspiration/Injection      Chronic bilateral knee pain x years, left knee pain = right knee pain. Pain is worse with prolonged standing/walking. Known mild/moderate tricompartmental arthritis in both knees with osteophytes on right. Good relief with previous steroid injections. Treatment options reviewed with patient and following plan made:     - Repeat BILATERAL knee injections done without complication. See procedure note.   -  Continue prn OTC motrin. Reviewed dosing and side effects. Take with food.   - Continue to use hinged knee braces prn.   - Weight loss encouraged. She is tentatively scheduled for gastric bypass in January.     Follow up in about 3 months (around 3/3/2021).

## 2020-12-03 ENCOUNTER — OFFICE VISIT (OUTPATIENT)
Dept: ORTHOPEDICS | Facility: CLINIC | Age: 40
End: 2020-12-03
Payer: MEDICAID

## 2020-12-03 VITALS
BODY MASS INDEX: 49.03 KG/M2 | RESPIRATION RATE: 20 BRPM | SYSTOLIC BLOOD PRESSURE: 126 MMHG | WEIGHT: 293 LBS | HEART RATE: 86 BPM | DIASTOLIC BLOOD PRESSURE: 78 MMHG

## 2020-12-03 DIAGNOSIS — M17.12 PRIMARY OSTEOARTHRITIS OF LEFT KNEE: Primary | ICD-10-CM

## 2020-12-03 DIAGNOSIS — M17.11 PRIMARY OSTEOARTHRITIS OF RIGHT KNEE: ICD-10-CM

## 2020-12-03 PROCEDURE — 20610 DRAIN/INJ JOINT/BURSA W/O US: CPT | Mod: 50,S$GLB,, | Performed by: PHYSICIAN ASSISTANT

## 2020-12-03 PROCEDURE — 20610 PR DRAIN/INJECT LARGE JOINT/BURSA: ICD-10-PCS | Mod: 50,S$GLB,, | Performed by: PHYSICIAN ASSISTANT

## 2020-12-03 PROCEDURE — 99999 PR PBB SHADOW E&M-EST. PATIENT-LVL III: ICD-10-PCS | Mod: PBBFAC,,, | Performed by: PHYSICIAN ASSISTANT

## 2020-12-03 PROCEDURE — 99214 PR OFFICE/OUTPT VISIT, EST, LEVL IV, 30-39 MIN: ICD-10-PCS | Mod: 25,S$PBB,, | Performed by: PHYSICIAN ASSISTANT

## 2020-12-03 PROCEDURE — 99214 OFFICE O/P EST MOD 30 MIN: CPT | Mod: 25,S$PBB,, | Performed by: PHYSICIAN ASSISTANT

## 2020-12-03 PROCEDURE — 99999 PR PBB SHADOW E&M-EST. PATIENT-LVL III: CPT | Mod: PBBFAC,,, | Performed by: PHYSICIAN ASSISTANT

## 2020-12-03 PROCEDURE — 99213 OFFICE O/P EST LOW 20 MIN: CPT | Mod: PBBFAC,PN | Performed by: PHYSICIAN ASSISTANT

## 2020-12-03 RX ORDER — TRIAMCINOLONE ACETONIDE 40 MG/ML
80 INJECTION, SUSPENSION INTRA-ARTICULAR; INTRAMUSCULAR
Status: DISCONTINUED | OUTPATIENT
Start: 2020-12-03 | End: 2020-12-03 | Stop reason: HOSPADM

## 2020-12-03 RX ADMIN — TRIAMCINOLONE ACETONIDE 80 MG: 40 INJECTION, SUSPENSION INTRA-ARTICULAR; INTRAMUSCULAR at 09:12

## 2020-12-03 NOTE — PATIENT INSTRUCTIONS
It is always good to see you. I am sorry that you are hurting so much.     The injections that I did today should give you some good relief of pain. It is normal to have some increased soreness over the next few days after an injection. Put ice on it and elevate. This will get better.     Wear the knee braces as needed for prolonged walking. This should give you added support and help with pain.     Continue on motrin as needed to help with pain/inflammation. Take as directed with food.     I will see you back in 3 months, but please stay in touch and call me if you need anything. You can also send me a message in [facility].     Rosie   [phone]

## 2020-12-03 NOTE — PROCEDURES
Large Joint Aspiration/Injection    Date/Time: 12/3/2020 9:30 AM  Performed by: Rosie Mcmillan PA-C  Authorized by: Rosie Mcmillan PA-C     Consent Done?:  Yes (Verbal)  Timeout: prior to procedure the correct patient, procedure, and site was verified    Medications:  80 mg triamcinolone acetonide 40 mg/mL     PROCEDURE NOTE:  BILATERAL KNEE INJECTION    I have explained the risks, benefits, and alternatives of the procedure in detail.  The patient voices understanding and all questions have been answered.  The patient agrees to proceed as planned.    After a sterile prep of the skin using chloraprep one step, the area was sprayed with local topical anesthetic and then cleaned with alcohol. Both the right and left knees were injected through an inferior lateral approach with a combination of 2 cc 1% plain xylocaine and 40mg triamcinolone.  The patient is cautioned that immediate relief of pain is secondary to the local anesthetic and will be temporary. After the anesthetic wears off there may be a increase in pain that may last for a few hours or a few days and they should use ice to help alleviate this this pain.     If patient is diabetic, post injection elevation of blood sugar was discussed. Patient is to check blood sugar regularly and call PCP with any issues.     Patient tolerated the procedure well.

## 2022-01-04 ENCOUNTER — TELEPHONE (OUTPATIENT)
Dept: ORTHOPEDICS | Facility: CLINIC | Age: 42
End: 2022-01-04
Payer: MEDICAID

## 2022-01-04 DIAGNOSIS — M17.11 PRIMARY OSTEOARTHRITIS OF RIGHT KNEE: ICD-10-CM

## 2022-01-04 DIAGNOSIS — M17.12 PRIMARY OSTEOARTHRITIS OF LEFT KNEE: Primary | ICD-10-CM

## 2022-01-04 NOTE — TELEPHONE ENCOUNTER
----- Message from Rosie Mcmillan PA-C sent at 1/4/2022  1:18 PM CST -----  She has appt with me on Thursday at 7am and needs bilateral knee XRs prior to seeing me. Can get prior to Thursday or can come at 6:30am that morning.     Thanks.      120

## 2022-01-04 NOTE — TELEPHONE ENCOUNTER
Pt states she will arrive early to have xrays done prior to checking in for her appt with Rosie Mcmillan.

## 2022-01-04 NOTE — PROGRESS NOTES
Subjective:      Patient ID: Arleen Powell is a 41 y.o. female.    Chief Complaint: No chief complaint on file.      HPI  (Donnie)    Last seen by me on 12/3/20 for bilateral knee pain. Known mild/moderate tricompartmental arthritis in both knees with osteophytes on right.     She was given bilateral knee injections (12/3/20) and hinged knee sleeves.     She is here for follow up.     She has lost over 100 pounds since having bariatric surgery April 2021. Noted swelling in left leg after working out about a month ago. Had doppler and was told she had fluid behind her left knee.     She has intermittent left knee pian that is worse with working out. She has locking, no giving way. She rates her pain as a 2 on a scale of 1-10. Pain is aching in nature. She has intermittent knee swelling. Right knee is doing fine.     She is not taking anything for her knee.       Past Medical History:   Diagnosis Date    Abnormal Pap smear     Abnormal Pap smear of vagina     Hypertension     Pre-eclampsia          Current Outpatient Medications:     multivitamin (ONE DAILY MULTIVITAMIN) per tablet, Take 1 tablet by mouth once daily., Disp: , Rfl:     norgestimate-ethinyl estradiol (TRI-LO-GALEN ORAL), Take by mouth., Disp: , Rfl:     pantoprazole (PROTONIX) 40 MG tablet, Take 40 mg by mouth once daily., Disp: , Rfl:   No current facility-administered medications for this visit.    Review of patient's allergies indicates:  No Known Allergies      Review of Systems   Constitutional: Negative for chills, fever, night sweats and weight gain.   Gastrointestinal: Negative for bowel incontinence, nausea and vomiting.   Genitourinary: Negative for bladder incontinence.   Neurological: Negative for disturbances in coordination and loss of balance.         Objective:        There were no vitals taken for this visit.    Ortho/SPM Exam     Body habitus is normal.   The patient walks without a limp.      RIGHT KNEE EXAM:  Resisted  SLR negative.   The skin over the knee is intact.  Knee effusion none   Tendernes is located n/a  Range of motion- Flexion full, Extension full,     Ligament exam:   MCL 1+ laxity   Lachman intact              Post sag intact    LCL intact    Patellar apprehension negative.  Popliteal cyst negative  Patellar crepitation present.  Flexion/pinch negative.    Motor normal 5/5 strength in all tested muscle groups.   Sensory normal.      LEFT KNEE EXAM:  Resisted SLR negative.   The skin over the knee is intact.  Knee effusion none   Tendernes is located medial  Range of motion- Flexion 130 deg, Extension 0 deg,     Ligament exam:   MCL 1+ laxity   Lachman intact              Post sag intact    LCL intact    Patellar apprehension negative.  Popliteal cyst not palpable. No tenderness  Patellar crepitation present.  Flexion/pinch negative.    Motor normal 5/5 strength in all tested muscle groups.   Sensory normal.      XRAY INTERPRETATION:  X-rays of bilateral knees dated 1/6/22 are personally reviewed and show moderate tricompartmental arthritis with osteophyes.        Assessment:       Encounter Diagnosis   Name Primary?    Primary osteoarthritis of left knee           Plan:       Diagnoses and all orders for this visit:    Primary osteoarthritis of left knee  -     Ambulatory referral/consult to Orthopedics      She has intermittent left knee pian that is worse with working out. She has locking, no giving way. Doppler done by PCP sounds like it showed a baker's cyst of left knee.     Known moderate tricompartmental arthritis with osteophyes.     Treatment options reviewed with patient along with above bilateral knee XRs and following plan made:     - Discussed natural history of baker's cyst.   - Overall, she is doing well and left knee pain is tolerable.   - Continue working out and losing weight.   - If pain in left knee gets worse, can do injection.     Follow up if symptoms worsen or fail to improve.

## 2022-01-06 ENCOUNTER — OFFICE VISIT (OUTPATIENT)
Dept: ORTHOPEDICS | Facility: CLINIC | Age: 42
End: 2022-01-06
Payer: MEDICAID

## 2022-01-06 DIAGNOSIS — M17.12 PRIMARY OSTEOARTHRITIS OF LEFT KNEE: ICD-10-CM

## 2022-01-06 PROCEDURE — 1160F RVW MEDS BY RX/DR IN RCRD: CPT | Mod: CPTII,,, | Performed by: PHYSICIAN ASSISTANT

## 2022-01-06 PROCEDURE — 1160F PR REVIEW ALL MEDS BY PRESCRIBER/CLIN PHARMACIST DOCUMENTED: ICD-10-PCS | Mod: CPTII,,, | Performed by: PHYSICIAN ASSISTANT

## 2022-01-06 PROCEDURE — 1159F PR MEDICATION LIST DOCUMENTED IN MEDICAL RECORD: ICD-10-PCS | Mod: CPTII,,, | Performed by: PHYSICIAN ASSISTANT

## 2022-01-06 PROCEDURE — 99213 PR OFFICE/OUTPT VISIT, EST, LEVL III, 20-29 MIN: ICD-10-PCS | Mod: S$PBB,,, | Performed by: PHYSICIAN ASSISTANT

## 2022-01-06 PROCEDURE — 99212 OFFICE O/P EST SF 10 MIN: CPT | Mod: PBBFAC,PN | Performed by: PHYSICIAN ASSISTANT

## 2022-01-06 PROCEDURE — 99999 PR PBB SHADOW E&M-EST. PATIENT-LVL II: CPT | Mod: PBBFAC,,, | Performed by: PHYSICIAN ASSISTANT

## 2022-01-06 PROCEDURE — 1159F MED LIST DOCD IN RCRD: CPT | Mod: CPTII,,, | Performed by: PHYSICIAN ASSISTANT

## 2022-01-06 PROCEDURE — 99999 PR PBB SHADOW E&M-EST. PATIENT-LVL II: ICD-10-PCS | Mod: PBBFAC,,, | Performed by: PHYSICIAN ASSISTANT

## 2022-01-06 PROCEDURE — 99213 OFFICE O/P EST LOW 20 MIN: CPT | Mod: S$PBB,,, | Performed by: PHYSICIAN ASSISTANT

## 2022-01-06 RX ORDER — MULTIVITAMIN
1 TABLET ORAL DAILY
COMMUNITY

## 2022-01-06 RX ORDER — PANTOPRAZOLE SODIUM 40 MG/1
40 TABLET, DELAYED RELEASE ORAL DAILY
COMMUNITY

## 2023-06-16 ENCOUNTER — PATIENT MESSAGE (OUTPATIENT)
Dept: PODIATRY | Facility: CLINIC | Age: 43
End: 2023-06-16
Payer: MEDICAID